# Patient Record
Sex: MALE | Race: WHITE | Employment: OTHER | URBAN - METROPOLITAN AREA
[De-identification: names, ages, dates, MRNs, and addresses within clinical notes are randomized per-mention and may not be internally consistent; named-entity substitution may affect disease eponyms.]

---

## 2017-02-14 ENCOUNTER — COMMUNICATION - HEALTHEAST (OUTPATIENT)
Dept: FAMILY MEDICINE | Facility: CLINIC | Age: 67
End: 2017-02-14

## 2017-02-14 DIAGNOSIS — E11.9 DIABETES (H): ICD-10-CM

## 2017-03-04 ENCOUNTER — COMMUNICATION - HEALTHEAST (OUTPATIENT)
Dept: FAMILY MEDICINE | Facility: CLINIC | Age: 67
End: 2017-03-04

## 2017-03-04 DIAGNOSIS — E78.5 HYPERLIPIDEMIA: ICD-10-CM

## 2017-04-06 ENCOUNTER — OFFICE VISIT - HEALTHEAST (OUTPATIENT)
Dept: FAMILY MEDICINE | Facility: CLINIC | Age: 67
End: 2017-04-06

## 2017-04-06 DIAGNOSIS — N40.0 BPH (BENIGN PROSTATIC HYPERPLASIA): ICD-10-CM

## 2017-04-06 DIAGNOSIS — E11.9 DIABETES MELLITUS, TYPE II (H): ICD-10-CM

## 2017-04-06 DIAGNOSIS — R25.2 LEG CRAMPS: ICD-10-CM

## 2017-04-06 LAB
HBA1C MFR BLD: 6.2 % (ref 3.5–6)
PSA SERPL-MCNC: 1.7 NG/ML (ref 0–4.5)

## 2017-04-06 ASSESSMENT — MIFFLIN-ST. JEOR: SCORE: 1752.36

## 2017-06-14 ENCOUNTER — RECORDS - HEALTHEAST (OUTPATIENT)
Dept: ADMINISTRATIVE | Facility: OTHER | Age: 67
End: 2017-06-14

## 2017-08-03 ENCOUNTER — RECORDS - HEALTHEAST (OUTPATIENT)
Dept: ADMINISTRATIVE | Facility: OTHER | Age: 67
End: 2017-08-03

## 2017-08-18 ENCOUNTER — THERAPY VISIT (OUTPATIENT)
Dept: PHYSICAL THERAPY | Facility: CLINIC | Age: 67
End: 2017-08-18
Payer: MEDICARE

## 2017-08-18 DIAGNOSIS — M25.551 RIGHT HIP PAIN: Primary | ICD-10-CM

## 2017-08-18 PROCEDURE — G8978 MOBILITY CURRENT STATUS: HCPCS | Mod: GP | Performed by: PHYSICAL THERAPIST

## 2017-08-18 PROCEDURE — 97112 NEUROMUSCULAR REEDUCATION: CPT | Mod: GP | Performed by: PHYSICAL THERAPIST

## 2017-08-18 PROCEDURE — 97161 PT EVAL LOW COMPLEX 20 MIN: CPT | Mod: GP | Performed by: PHYSICAL THERAPIST

## 2017-08-18 PROCEDURE — G8979 MOBILITY GOAL STATUS: HCPCS | Mod: GP | Performed by: PHYSICAL THERAPIST

## 2017-08-18 ASSESSMENT — ACTIVITIES OF DAILY LIVING (ADL)
SITTING_FOR_15_MINUTES: NO DIFFICULTY AT ALL
GETTING_INTO_AND_OUT_OF_AN_AVERAGE_CAR: MODERATE DIFFICULTY
WALKING_15_MINUTES_OR_GREATER: EXTREME DIFFICULTY
STANDING_FOR_15_MINUTES: MODERATE DIFFICULTY
LIGHT_TO_MODERATE_WORK: MODERATE DIFFICULTY
ROLLING_OVER_IN_BED: SLIGHT DIFFICULTY
HOS_ADL_ITEM_SCORE_TOTAL: 17
HOS_ADL_COUNT: 10
PUTTING_ON_SOCKS_AND_SHOES: UNABLE TO DO
WALKING_APPROXIMATELY_10_MINUTES: EXTREME DIFFICULTY
HEAVY_WORK: EXTREME DIFFICULTY
WALKING_INITIALLY: MODERATE DIFFICULTY
TWISTING/PIVOTING_ON_INVOLVED_LEG: UNABLE TO DO
HOS_ADL_HIGHEST_POTENTIAL_SCORE: 40
STEPPING_UP_AND_DOWN_CURBS: SLIGHT DIFFICULTY
HOW_WOULD_YOU_RATE_YOUR_CURRENT_LEVEL_OF_FUNCTION_DURING_YOUR_USUAL_ACTIVITIES_OF_DAILY_LIVING_FROM_0_TO_100_WITH_100_BEING_YOUR_LEVEL_OF_FUNCTION_PRIOR_TO_YOUR_HIP_PROBLEM_AND_0_BEING_THE_INABILITY_TO_PERFORM_ANY_OF_YOUR_USUAL_DAILY_ACTIVITIES?: 30

## 2017-08-18 NOTE — LETTER
"DEPARTMENT OF HEALTH AND HUMAN SERVICES  CENTERS FOR MEDICARE & MEDICAID SERVICES    PLAN/UPDATED PLAN OF PROGRESS FOR OUTPATIENT REHABILITATION    PATIENTS NAME:  John Mack   : 1950  PROVIDER NUMBER:    [id number]  [id number]  PROVIDER NAME: [facility] PT  MEDICAL RECORD NUMBER: [id number]   START OF CARE DATE:  SOC Date: 17   TYPE:  PT  PRIMARY/TREATMENT DIAGNOSIS: (Pertinent Medical Diagnosis)  Right hip pain    VISITS FROM START OF CARE:  Rxs Used: 1     [facility] Initial Evaluation    Subjective:  Patient is a 66 year old male presenting with rehab right hip hpi. The history is provided by the patient. No  was used. John Mack is a 66 year old male with a right hip condition.  Condition occurred with:  Insidious onset.  Condition occurred: for unknown reasons.  This is a new condition  Patient reports history of spontaneous right hip fracture 3 years ago. Underwent ORIF but it \"didn't work\" so he ended up having right JACKELIN 2 years ago. On 17, patient describes sliding into a farr at a restaurant when his right hip dislocated. He was transported by ambulance to the ED where his hip was eventually reduced. Just four days prior to this incident, he also recalls his hip \"popping out and then back in again\" when getting off the couch at home. Patient reports pain:  Posterior.  Radiates to:  Thigh.  Pain is described as aching and is intermittent and reported as 2/10.  Associated symptoms:  Loss of strength, loss of motion/stiffness and other (instability). Pain is the same all the time.  Symptoms are exacerbated by transfers and walking and relieved by rest.  Since onset symptoms are unchanged.  Special tests:  X-ray.  Previous treatment includes physical therapy (following original hip surgery).  There was significant improvement following previous treatment.  General health as reported by patient is fair.  " "Pertinent medical history includes:  Sleep disorder/apnea, overweight, diabetes, history of fractures and other (osteopenia; foot drop from JACKELIN -- currently wears AFO).  Medical allergies: no.  Surgical history: right hip ORIF, right JACKELIN, left ankle ORIF.  Medication history: Metformin, Simvastatin.  Current occupation is Retired.      Barriers include:  Requires assistance with ADL's (dressing LE's).  Red flags:  None as reported by the patient.    Objective:  Standing Alignment:    Lumbar:  Lordosis decr  Pelvic:  Iliac crest high R  Hip:  Greater trochanter high R  Gait:    Weight Bearing Status:  WBAT   Assistive Devices:  Cane  Deviations:  General Deviations:  Stride length decr and stance time decr  Flexibility/Screens:   Lower Extremity:  Decreased right lower extremity flexibility:  Quadriceps  PATIENTS NAME:  John Mack   : 1950     Hip Evaluation  HIP AROM:    Flexion: Left: 110    Right:  80 (\"tight\")   Abduction: Left:  WNL     Right:  10  Internal Rotation: Left:   Right: Decreased  External Rotation: Left:   Right: Decreased  Knee Flexion:  Left: 130    Right: 110  Knee Extension: Left: WNL    Right: WNL    Hip Strength:    Flexion:   Left: 5/5   -  Pain:  Right: 4/5   -  Pain:     Extension:  Left: 3+/5  Pain:Right: 3-/5    -  Pain:    Abduction:  Left: 5/5    -   Pain:Right: 4+/5   -   Pain:  Internal Rotation:  Left: 4+/5   -   Pain:Right: 3+/5   -  Pain:  External Rotation:  Left: 4+/5   -  Pain:  Right: 3/5   -  Pain:  Knee Flexion:  Left: 5/5   -  Pain:Right: 5/5   -  Pain:  Knee Extension:  Left: 5/5   -  Pain:Right: 5/5    -  Pain:    Hip Special Testing:   Not Assessed  Hip Palpation:    Right hip tenderness present at:  Piriformis; Abductors; Gluteus Medius and Bursa  Right hip tenderness not present at:  Greater Trachanter  General    Delayed glute firing in right hip  Assessment/Plan:    Patient is a 66 year old male with right hip complaints.    Patient has the following " significant findings with corresponding treatment plan.                Diagnosis 1:  Right Hip Dislocation   Pain -  self management, education and home program  Decreased ROM/flexibility - therapeutic exercise  Instability - therapeutic exercise  Decreased strength - therapeutic exercise and therapeutic activities  Impaired gait - gait training and assistive devices  Decreased function - therapeutic activities and home program  Therapy Evaluation Codes:   1) History comprised of:   Personal factors that impact the plan of care:      Past/current experiences.    Comorbidity factors that impact the plan of care are:      Diabetes, Implanted device and osteopenia.     Medications impacting care: Metformin.  2) Examination of Body Systems comprised of:   Body structures and functions that impact the plan of care:      Hip.   Activity limitations that impact the plan of care are:      Bending, Driving, Dressing, Walking and transfers.  3) Clinical presentation characteristics are:   Stable/Uncomplicated.  4) Decision-Making    Low complexity using standardized patient assessment instrument and/or measureable assessment of functional outcome.  PATIENTS NAME:  John Mack   : 1950  Cumulative Therapy Evaluation is: Low complexity.  Previous and current functional limitations:  (See Goal Flow Sheet for this information)    Short term and Long term goals: (See Goal Flow Sheet for this information)   Communication ability:  Patient appears to be able to clearly communicate and understand verbal and written communication and follow directions correctly.  Treatment Explanation - The following has been discussed with the patient:    RX ordered/plan of care  Anticipated outcomes  Possible risks and side effects  This patient would benefit from PT intervention to resume normal activities.   Rehab potential is good.  Frequency:  2 X week, once daily  Duration:  for 2 weeks tapering to 1 X a week over 4  "weeks  Discharge Plan:  Achieve all LTG.  Independent in home treatment program.  Reach maximal therapeutic benefit.  Please refer to the daily flowsheet for treatment today, total treatment time and time spent performing 1:1 timed codes.             Caregiver Signature/Credentials _____________________________ Date ________      Treating Provider: Tish Arce, PT, OCS   I have reviewed and certified the need for these services and plan of treatment while under my care.        PHYSICIAN'S SIGNATURE:   _________________________________________  Date___________   Shen Madden    Certification period:  Beginning of Cert date period: 08/18/17 to  End of Cert period date: 11/15/17     Functional Level Progress Report: Please see attached \"Goal Flow sheet for Functional level.\"    ____X____ Continue Services or       ________ DC Services                Service dates: From  SOC Date: 08/18/17 date to present                         "

## 2017-08-18 NOTE — PROGRESS NOTES
"[facility] Initial Evaluation    Subjective:    Patient is a 66 year old male presenting with rehab right hip hpi. The history is provided by the patient. No  was used.   John Mack is a 66 year old male with a right hip condition.  Condition occurred with:  Insidious onset.  Condition occurred: for unknown reasons.  This is a new condition  Patient reports history of spontaneous right hip fracture 3 years ago. Underwent ORIF but it \"didn't work\" so he ended up having right JACKELIN 2 years ago. On 8/03/17, patient describes sliding into a farr at a restaurant when his right hip dislocated. He was transported by ambulance to the ED where his hip was eventually reduced. Just four days prior to this incident, he also recalls his hip \"popping out and then back in again\" when getting off the couch at home    .    Patient reports pain:  Posterior.  Radiates to:  Thigh.  Pain is described as aching and is intermittent and reported as 2/10.  Associated symptoms:  Loss of strength, loss of motion/stiffness and other (instability). Pain is the same all the time.  Symptoms are exacerbated by transfers and walking and relieved by rest.  Since onset symptoms are unchanged.  Special tests:  X-ray.  Previous treatment includes physical therapy (following original hip surgery).  There was significant improvement following previous treatment.  General health as reported by patient is fair.  Pertinent medical history includes:  Sleep disorder/apnea, overweight, diabetes, history of fractures and other (osteopenia; foot drop from JACKELIN -- currently wears AFO).  Medical allergies: no.  Surgical history: right hip ORIF, right JACKELIN, left ankle ORIF.  Medication history: Metformin, Simvastatin.  Current occupation is Retired.        Barriers include:  Requires assistance with ADL's (dressing LE's).    Red flags:  None as reported by the patient.                        Objective:    Standing " "Alignment:        Lumbar:  Lordosis decr  Pelvic:  Iliac crest high R  Hip:  Greater trochanter high R        Gait:    Weight Bearing Status:  WBAT   Assistive Devices:  Cane  Deviations:  General Deviations:  Stride length decr and stance time decr    Flexibility/Screens:       Lower Extremity:      Decreased right lower extremity flexibility:  Quadriceps                                                 Hip Evaluation  HIP AROM:    Flexion: Left: 110    Right:  80 (\"tight\")       Abduction: Left:  WNL     Right:  10      Internal Rotation: Left:   Right: Decreased  External Rotation: Left:   Right: Decreased  Knee Flexion:  Left: 130    Right: 110  Knee Extension: Left: WNL    Right: WNL    Hip Strength:    Flexion:   Left: 5/5   -  Pain:  Right: 4/5   -  Pain:                    Extension:  Left: 3+/5  Pain:Right: 3-/5    -  Pain:    Abduction:  Left: 5/5    -   Pain:Right: 4+/5   -   Pain:    Internal Rotation:  Left: 4+/5   -   Pain:Right: 3+/5   -  Pain:  External Rotation:  Left: 4+/5   -  Pain:  Right: 3/5   -  Pain:  Knee Flexion:  Left: 5/5   -  Pain:Right: 5/5   -  Pain:  Knee Extension:  Left: 5/5   -  Pain:Right: 5/5    -  Pain:        Hip Special Testing:   Not Assessed        Hip Palpation:      Right hip tenderness present at:  Piriformis; Abductors; Gluteus Medius and Bursa  Right hip tenderness not present at:  Greater Trachanter             General    Delayed glute firing in right hip    ROS    Assessment/Plan:      Patient is a 66 year old male with right hip complaints.    Patient has the following significant findings with corresponding treatment plan.                Diagnosis 1:  Right Hip Dislocation   Pain -  self management, education and home program  Decreased ROM/flexibility - therapeutic exercise  Instability - therapeutic exercise  Decreased strength - therapeutic exercise and therapeutic activities  Impaired gait - gait training and assistive devices  Decreased function - therapeutic " activities and home program    Therapy Evaluation Codes:   1) History comprised of:   Personal factors that impact the plan of care:      Past/current experiences.    Comorbidity factors that impact the plan of care are:      Diabetes, Implanted device and osteopenia.     Medications impacting care: Metformin.  2) Examination of Body Systems comprised of:   Body structures and functions that impact the plan of care:      Hip.   Activity limitations that impact the plan of care are:      Bending, Driving, Dressing, Walking and transfers.  3) Clinical presentation characteristics are:   Stable/Uncomplicated.  4) Decision-Making    Low complexity using standardized patient assessment instrument and/or measureable assessment of functional outcome.  Cumulative Therapy Evaluation is: Low complexity.    Previous and current functional limitations:  (See Goal Flow Sheet for this information)    Short term and Long term goals: (See Goal Flow Sheet for this information)     Communication ability:  Patient appears to be able to clearly communicate and understand verbal and written communication and follow directions correctly.  Treatment Explanation - The following has been discussed with the patient:    RX ordered/plan of care  Anticipated outcomes  Possible risks and side effects  This patient would benefit from PT intervention to resume normal activities.   Rehab potential is good.    Frequency:  2 X week, once daily  Duration:  for 2 weeks tapering to 1 X a week over 4 weeks  Discharge Plan:  Achieve all LTG.  Independent in home treatment program.  Reach maximal therapeutic benefit.    Please refer to the daily flowsheet for treatment today, total treatment time and time spent performing 1:1 timed codes.

## 2017-08-18 NOTE — MR AVS SNAPSHOT
After Visit Summary   8/18/2017    John Mack    MRN: [id number]           Patient Information     Date Of Birth          1950        Visit Information        Provider Department      8/18/2017 1:40 PM Tish Arce, PT [facility] [place] [facility]        Today's Diagnoses     Right hip pain    -  1       Follow-ups after your visit        Your next 10 appointments already scheduled     Aug 21, 2017  9:25 AM CDT   DARYL Extremity with Jericho Monsivais PT   [facility] [place] [facility] ([facility] [place])    [address]  [place] [address]   [phone]            Aug 25, 2017  8:05 AM CDT   DARYL Extremity with Jericho Monsivais PT   [facility] [place] [facility] ([facility] [place])    [address]  [place] [address]   [phone]            Sep 01, 2017 10:30 AM CDT   DARYL Extremity with Tish Arce PT   [facility] [place] [facility] ([facility] [place])    [address]  [place] [address]   [phone]            Sep 08, 2017 10:45 AM CDT   DARYL Extremity with Jericho Monsivais PT   [facility] [place] [facility] ([facility] [place])    [address]  [place] [address]   [phone]              Who to contact     If you have questions or need follow up information about today's clinic visit or your schedule please contact [facility] directly at [phone].  Normal or non-critical lab and imaging results will be communicated to you by MyChart, letter or phone within 4 business days after the clinic has received the results. If you do not hear from us within 7 days, please contact the clinic through MyChart or phone. If you have a critical or abnormal lab result, we will notify you by phone as soon as possible.  Submit refill requests through [employer] or call your pharmacy and they will forward the  "refill request to us. Please allow 3 business days for your refill to be completed.          Additional Information About Your Visit        MyChart Information     [employer] lets you send messages to your doctor, view your test results, renew your prescriptions, schedule appointments and more. To sign up, go to [email or web address] . Click on \"Log in\" on the left side of the screen, which will take you to the Welcome page. Then click on \"Sign up Now\" on the right side of the page.     You will be asked to enter the access code listed below, as well as some personal information. Please follow the directions to create your username and password.     Your access code is: [email or web address]  Expires: 2017  4:48 PM     Your access code will  in 90 days. If you need help or a new code, please call your [facility] clinic or [phone].        Care EveryWhere ID     This is your Care EveryWhere ID. This could be used by other organizations to access your [facility] medical records  [id number]         Blood Pressure from Last 3 Encounters:   No data found for BP    Weight from Last 3 Encounters:   No data found for Wt              We Performed the Following     HC PT EVAL, LOW COMPLEXITY     DARYL CERT REPORT     NEUROMUSCULAR RE-EDUCATION        Primary Care Provider Office Phone # Fax #    Girma BONITA Woodruff [phone]       [facility] [address] [place] [address] [address] [place] [address]        Equal Access to Services     [facility]: Hadii aad ku hadasho Soomaali, waaxda luqadaha, qaybta kaalmada adeegyada, baldemar cheatham . So [facility] [phone].    ATENCIÓN: Si habla español, tiene a sánchez disposición servicios gratuitos de asistencia lingüística. Llame al [phone].    We comply with applicable federal civil rights laws and [place] laws. We do not discriminate on the basis of race, color, national origin, age, disability sex, sexual orientation or gender identity.          "   Thank you!     Thank you for choosing [facility]  for your care. Our goal is always to provide you with excellent care. Hearing back from our patients is one way we can continue to improve our services. Please take a few minutes to complete the written survey that you may receive in the mail after your visit with us. Thank you!             Your Updated Medication List - Protect others around you: Learn how to safely use, store and throw away your medicines at www.disposemymeds.org.      Notice  As of 8/18/2017  4:48 PM    You have not been prescribed any medications.

## 2017-08-21 ENCOUNTER — THERAPY VISIT (OUTPATIENT)
Dept: PHYSICAL THERAPY | Facility: CLINIC | Age: 67
End: 2017-08-21
Payer: MEDICARE

## 2017-08-21 DIAGNOSIS — M25.551 RIGHT HIP PAIN: ICD-10-CM

## 2017-08-21 PROCEDURE — 97110 THERAPEUTIC EXERCISES: CPT | Mod: GP | Performed by: PHYSICAL THERAPIST

## 2017-08-21 PROCEDURE — 97530 THERAPEUTIC ACTIVITIES: CPT | Mod: GP | Performed by: PHYSICAL THERAPIST

## 2017-08-21 PROCEDURE — 97112 NEUROMUSCULAR REEDUCATION: CPT | Mod: GP | Performed by: PHYSICAL THERAPIST

## 2017-08-25 ENCOUNTER — THERAPY VISIT (OUTPATIENT)
Dept: PHYSICAL THERAPY | Facility: CLINIC | Age: 67
End: 2017-08-25
Payer: MEDICARE

## 2017-08-25 DIAGNOSIS — M25.551 RIGHT HIP PAIN: ICD-10-CM

## 2017-08-25 PROCEDURE — 97110 THERAPEUTIC EXERCISES: CPT | Mod: GP | Performed by: PHYSICAL THERAPIST

## 2017-08-25 PROCEDURE — 97112 NEUROMUSCULAR REEDUCATION: CPT | Mod: GP | Performed by: PHYSICAL THERAPIST

## 2017-08-25 PROCEDURE — 97530 THERAPEUTIC ACTIVITIES: CPT | Mod: GP | Performed by: PHYSICAL THERAPIST

## 2017-08-25 NOTE — MR AVS SNAPSHOT
"              After Visit Summary   8/25/2017    John Mack    MRN: [id number]           Patient Information     Date Of Birth          1950        Visit Information        Provider Department      8/25/2017 8:05 AM Jericho Monsivais, PT [facility] [place] [facility]        Today's Diagnoses     Right hip pain           Follow-ups after your visit        Your next 10 appointments already scheduled     Sep 01, 2017 10:30 AM CDT   DARYL Extremity with Tish Arce PT   [facility] [place] [facility] ([facility] [place])    [address]  [place] [address]   [phone]            Sep 08, 2017 10:45 AM CDT   DARYL Extremity with Jericho Monsivais PT   [facility] [place] [facility] ([facility] [place])    [address]  [place] [address]   [phone]              Who to contact     If you have questions or need follow up information about today's clinic visit or your schedule please contact [facility] directly at [phone].  Normal or non-critical lab and imaging results will be communicated to you by [employer], letter or phone within 4 business days after the clinic has received the results. If you do not hear from us within 7 days, please contact the clinic through [employer] or phone. If you have a critical or abnormal lab result, we will notify you by phone as soon as possible.  Submit refill requests through [employer] or call your pharmacy and they will forward the refill request to us. Please allow 3 business days for your refill to be completed.          Additional Information About Your Visit        [employer] Information     [employer] lets you send messages to your doctor, view your test results, renew your prescriptions, schedule appointments and more. To sign up, go to [email or web address] . Click on \"Log in\" on the left side of the screen, which will take you to the Welcome page. Then click " "on \"Sign up Now\" on the right side of the page.     You will be asked to enter the access code listed below, as well as some personal information. Please follow the directions to create your username and password.     Your access code is: [email or web address]  Expires: 2017  4:48 PM     Your access code will  in 90 days. If you need help or a new code, please call your [facility] or [phone].        Care EveryWhere ID     This is your Care EveryWhere ID. This could be used by other organizations to access your [facility] medical records  [id number]         Blood Pressure from Last 3 Encounters:   No data found for BP    Weight from Last 3 Encounters:   No data found for Wt              We Performed the Following     Neuromuscular Re-Education     Therapeutic Activities     Therapeutic Exercises        Primary Care Provider Office Phone # Fax #    Girma Woodruff [phone]       [facility] [address] [place] [address] Dr [place] [address]        Equal Access to Services     [facility]: Hadii aad ku hadasho Soomaali, waaxda luqadaha, qaybta kaalmada adeegyada, waxay idiin hayaan alisaeg juan carlos cheatham . So [facility] [phone].    ATENCIÓN: Si habla español, tiene a sánchez disposición servicios gratuitos de asistencia lingüística. Llame al [phone].    We comply with applicable federal civil rights laws and [place] laws. We do not discriminate on the basis of race, color, national origin, age, disability sex, sexual orientation or gender identity.            Thank you!     Thank you for choosing [facility]  for your care. Our goal is always to provide you with excellent care. Hearing back from our patients is one way we can continue to improve our services. Please take a few minutes to complete the written survey that you may receive in the mail after your visit with us. Thank you!             Your Updated Medication List - Protect others around you: Learn " how to safely use, store and throw away your medicines at www.disposemymeds.org.      Notice  As of 8/25/2017 12:27 PM    You have not been prescribed any medications.

## 2017-08-25 NOTE — PROGRESS NOTES
Subjective:    HPI                    Objective:    System    Physical Exam    General     ROS    Assessment/Plan:      SUBJECTIVE  Subjective: Pt reports he continues to gain confidence in the leg and is nothing more than muscular sore.  Doesn't use the cane all of the time.   Current Pain level: 2/10   Changes in function:  Yes (See Goal flowsheet attached for changes in current functional level)     Adverse reaction to treatment or activity:  None    OBJECTIVE  Objective: Pt ambulates without device, short steps B.  Observed L hip abduction at late swing phase L.     ASSESSMENT  John continues to require intervention to meet STG and LTG's: PT  Patient is progressing as expected.  Response to therapy has shown an improvement in  gait and function  Progress made towards STG/LTG?  Yes (See Goal flowsheet attached for updates on achievement of STG and LTG)    PLAN  Current treatment program is being advanced to more complex exercises.  Continue to emphasize gluteal facilitation.    PTA/ATC plan:  N/A    Please refer to the daily flowsheet for treatment today, total treatment time and time spent performing 1:1 timed codes.

## 2017-09-01 ENCOUNTER — THERAPY VISIT (OUTPATIENT)
Dept: PHYSICAL THERAPY | Facility: CLINIC | Age: 67
End: 2017-09-01
Payer: MEDICARE

## 2017-09-01 DIAGNOSIS — M25.551 RIGHT HIP PAIN: ICD-10-CM

## 2017-09-01 PROCEDURE — 97110 THERAPEUTIC EXERCISES: CPT | Mod: GP | Performed by: PHYSICAL THERAPIST

## 2017-09-01 PROCEDURE — 97112 NEUROMUSCULAR REEDUCATION: CPT | Mod: GP | Performed by: PHYSICAL THERAPIST

## 2017-09-08 ENCOUNTER — THERAPY VISIT (OUTPATIENT)
Dept: PHYSICAL THERAPY | Facility: CLINIC | Age: 67
End: 2017-09-08
Payer: MEDICARE

## 2017-09-08 DIAGNOSIS — M25.551 RIGHT HIP PAIN: ICD-10-CM

## 2017-09-08 PROCEDURE — 97110 THERAPEUTIC EXERCISES: CPT | Mod: GP | Performed by: PHYSICAL THERAPIST

## 2017-09-08 PROCEDURE — 97530 THERAPEUTIC ACTIVITIES: CPT | Mod: GP | Performed by: PHYSICAL THERAPIST

## 2017-09-08 ASSESSMENT — ACTIVITIES OF DAILY LIVING (ADL)
HOS_ADL_ITEM_SCORE_TOTAL: 31
LIGHT_TO_MODERATE_WORK: MODERATE DIFFICULTY
WALKING_APPROXIMATELY_10_MINUTES: NO DIFFICULTY AT ALL
DEEP_SQUATTING: EXTREME DIFFICULTY
TWISTING/PIVOTING_ON_INVOLVED_LEG: MODERATE DIFFICULTY
WALKING_INITIALLY: NO DIFFICULTY AT ALL
STEPPING_UP_AND_DOWN_CURBS: SLIGHT DIFFICULTY
HEAVY_WORK: EXTREME DIFFICULTY
WALKING_15_MINUTES_OR_GREATER: NO DIFFICULTY AT ALL
HOS_ADL_HIGHEST_POTENTIAL_SCORE: 48
PUTTING_ON_SOCKS_AND_SHOES: UNABLE TO DO
GETTING_INTO_AND_OUT_OF_AN_AVERAGE_CAR: MODERATE DIFFICULTY
STANDING_FOR_15_MINUTES: SLIGHT DIFFICULTY
ROLLING_OVER_IN_BED: SLIGHT DIFFICULTY
HOS_ADL_COUNT: 12
SITTING_FOR_15_MINUTES: NO DIFFICULTY AT ALL
RECREATIONAL_ACTIVITIES: MODERATE DIFFICULTY

## 2017-09-08 NOTE — MR AVS SNAPSHOT
"              After Visit Summary   9/8/2017    John Mack    MRN: [id number]           Patient Information     Date Of Birth          1950        Visit Information        Provider Department      9/8/2017 10:45 AM Jericho Monsivais, PT [facility] [place] [facility]        Today's Diagnoses     Right hip pain           Follow-ups after your visit        Your next 10 appointments already scheduled     Sep 26, 2017  1:50 PM CDT   DARYL Extremity with Jericho Monsivais PT   [facility] [place] [facility] ([facility] [place])    [address]  [place] [address]   [phone]            Oct 10, 2017  1:50 PM CDT   DARYL Extremity with Jericho Monsivais PT   [facility] [place] [facility] ([facility] [place])    [address]  [place] [address]   [phone]              Who to contact     If you have questions or need follow up information about today's clinic visit or your schedule please contact [facility] [facility] directly at [phone].  Normal or non-critical lab and imaging results will be communicated to you by [employer], letter or phone within 4 business days after the clinic has received the results. If you do not hear from us within 7 days, please contact the clinic through [employer] or phone. If you have a critical or abnormal lab result, we will notify you by phone as soon as possible.  Submit refill requests through [employer] or call your pharmacy and they will forward the refill request to us. Please allow 3 business days for your refill to be completed.          Additional Information About Your Visit        [employer] Information     NuFlick lets you send messages to your doctor, view your test results, renew your prescriptions, schedule appointments and more. To sign up, go to [email or web address] . Click on \"Log in\" on the left side of the screen, which will take you to the Welcome page. Then click on " "\"Sign up Now\" on the right side of the page.     You will be asked to enter the access code listed below, as well as some personal information. Please follow the directions to create your username and password.     Your access code is: [email or web address]  Expires: 2017  4:48 PM     Your access code will  in 90 days. If you need help or a new code, please call your [facility] or [phone].        Care EveryWhere ID     This is your Care EveryWhere ID. This could be used by other organizations to access your [place] medical records  [id number]         Blood Pressure from Last 3 Encounters:   No data found for BP    Weight from Last 3 Encounters:   No data found for Wt              We Performed the Following     Therapeutic Activities     Therapeutic Exercises        Primary Care Provider Office Phone # Fax #    Girma MESA Ranjan [phone]       [facility] [address] [place] [address] Dr [place] [address]        Equal Access to Services     SANTHOSH HOYOS : Hadii aad ku hadasho Soomaali, waaxda luqadaha, qaybta kaalmada adeegyada, waxay idiin hayaan izabel khronald cheatham . So [facility] [phone].    ATENCIÓN: Si shaw serrato, tiene a sánchez disposición servicios gratuitos de asistencia lingüística. Llame al [phone].    We comply with applicable federal civil rights laws and [place] laws. We do not discriminate on the basis of race, color, national origin, age, disability sex, sexual orientation or gender identity.            Thank you!     Thank you for choosing [facility]  for your care. Our goal is always to provide you with excellent care. Hearing back from our patients is one way we can continue to improve our services. Please take a few minutes to complete the written survey that you may receive in the mail after your visit with us. Thank you!             Your Updated Medication List - Protect others around you: Learn how to safely use, store and throw " away your medicines at www.disposemymeds.org.      Notice  As of 9/8/2017 11:45 AM    You have not been prescribed any medications.

## 2017-09-08 NOTE — PROGRESS NOTES
"Subjective:    HPI                    Objective:    System    Physical Exam    General     ROS    Assessment/Plan:      SUBJECTIVE  Subjective: Pt reports he feels like he is gaining strength and control.  Has been walking more and getting \"tired but not sore.\"  Still fearful of recurrent dislocation.  Not yet fully dressing self as he relies on his wife to assist with socks, shoes, and sometimes pants.  \"I'd like to be not so dependent on my wife.\"   Current Pain level: 2/10   Changes in function:  Yes (See Goal flowsheet attached for changes in current functional level)     Adverse reaction to treatment or activity:  None    OBJECTIVE  Objective: Pt ambulates carrying cane without using it.  Pt able to reach dorsum of foot in sitting position with his own hands.     ASSESSMENT  John continues to require intervention to meet STG and LTG's: PT  Patient is progressing as expected.  Response to therapy has shown an improvement in  function  Progress made towards STG/LTG?  Yes (See Goal flowsheet attached for updates on achievement of STG and LTG)    PLAN  Good active progress to date and spent time today focusing on functional dressing tasks.  Two visits scheduled over the next two to three weeks.    PTA/ATC plan:  N/A    Please refer to the daily flowsheet for treatment today, total treatment time and time spent performing 1:1 timed codes.              "

## 2017-09-26 ENCOUNTER — THERAPY VISIT (OUTPATIENT)
Dept: PHYSICAL THERAPY | Facility: CLINIC | Age: 67
End: 2017-09-26
Payer: MEDICARE

## 2017-09-26 DIAGNOSIS — M25.551 RIGHT HIP PAIN: ICD-10-CM

## 2017-09-26 PROCEDURE — 97110 THERAPEUTIC EXERCISES: CPT | Mod: GP | Performed by: PHYSICAL THERAPIST

## 2017-09-26 NOTE — PROGRESS NOTES
Subjective:    HPI                    Objective:    System    Physical Exam    General     ROS    Assessment/Plan:      SUBJECTIVE  Subjective: Pt reports in general has been doing well.  Feels like has been a little more sore with weather changes and stiffest first thing in the morning.   Current Pain level: 2/10   Changes in function:  Yes (See Goal flowsheet attached for changes in current functional level)     Adverse reaction to treatment or activity:  None    OBJECTIVE  Objective: Pt ambulates without device.  Discomfort with resisted hip abduction and extension but not flexion or adduction.     ASSESSMENT  John continues to require intervention to meet STG and LTG's: PT  Patient is progressing as expected.  Response to therapy has shown an improvement in  function  Progress made towards STG/LTG?  Yes (See Goal flowsheet attached for updates on achievement of STG and LTG)    PLAN  Continue current treatment plan until patient demonstrates readiness to progress to higher level exercises.    PTA/ATC plan:  N/A    Please refer to the daily flowsheet for treatment today, total treatment time and time spent performing 1:1 timed codes.

## 2017-09-26 NOTE — MR AVS SNAPSHOT
"              After Visit Summary   9/26/2017    John Mack    MRN: [id number]           Patient Information     Date Of Birth          1950        Visit Information        Provider Department      9/26/2017 1:50 PM Jericho Monsivais PT [facility] [place] [facility]        Today's Diagnoses     Right hip pain           Follow-ups after your visit        Your next 10 appointments already scheduled     Oct 10, 2017  1:50 PM CDT   DARYL Extremity with Jericho Monsivais PT   [facility] [place] [facility] ([facility] [place])    [address]  [address]  [place] [address] [phone]              Who to contact     If you have questions or need follow up information about today's clinic visit or your schedule please contact [facility] directly at [phone].  Normal or non-critical lab and imaging results will be communicated to you by [employer], letter or phone within 4 business days after the clinic has received the results. If you do not hear from us within 7 days, please contact the clinic through [employer] or phone. If you have a critical or abnormal lab result, we will notify you by phone as soon as possible.  Submit refill requests through [employer] or call your pharmacy and they will forward the refill request to us. Please allow 3 business days for your refill to be completed.          Additional Information About Your Visit        MyChart Information     Bu[employer] lets you send messages to your doctor, view your test results, renew your prescriptions, schedule appointments and more. To sign up, go to [email or web address] . Click on \"Log in\" on the left side of the screen, which will take you to the Welcome page. Then click on \"Sign up Now\" on the right side of the page.     You will be asked to enter the access code listed below, as well as some personal information. Please follow the directions to create your username and password.   "   Your access code is: [email or web address]  Expires: 2017  4:48 PM     Your access code will  in 90 days. If you need help or a new code, please call your [place] clinic or [phone].        Care EveryWhere ID     This is your Care EveryWhere ID. This could be used by other organizations to access your [place] medical records  [id number]         Blood Pressure from Last 3 Encounters:   No data found for BP    Weight from Last 3 Encounters:   No data found for Wt              We Performed the Following     Therapeutic Exercises        Primary Care Provider Office Phone # Fax #    Girma Woodruff [phone]       [facility] [address] [place] [address]        Equal Access to Services     EARL HOYOS : Hadii aad ku hadasho Soomaali, waaxda luqadaha, qaybta kaalmada adeegyada, waxay abimaelin hayteresan izabel cheatham . So [facility] [place] [phone].    ATENCIÓN: Si habla español, tiene a sánchez disposición servicios gratuitos de asistencia lingüística. LlSt. [facility] [phone].    We comply with applicable federal civil rights laws and [place] laws. We do not discriminate on the basis of race, color, national origin, age, disability sex, sexual orientation or gender identity.            Thank you!     Thank you for choosing [facility]  for your care. Our goal is always to provide you with excellent care. Hearing back from our patients is one way we can continue to improve our services. Please take a few minutes to complete the written survey that you may receive in the mail after your visit with us. Thank you!             Your Updated Medication List - Protect others around you: Learn how to safely use, store and throw away your medicines at www.disposemymeds.org.      Notice  As of 2017  3:17 PM    You have not been prescribed any medications.

## 2017-10-09 ENCOUNTER — OFFICE VISIT - HEALTHEAST (OUTPATIENT)
Dept: FAMILY MEDICINE | Facility: CLINIC | Age: 67
End: 2017-10-09

## 2017-10-09 DIAGNOSIS — R94.4 DECREASED GFR: ICD-10-CM

## 2017-10-09 DIAGNOSIS — E78.5 HYPERLIPIDEMIA: ICD-10-CM

## 2017-10-09 DIAGNOSIS — S73.004A HIP DISLOCATION, RIGHT (H): ICD-10-CM

## 2017-10-09 DIAGNOSIS — E11.9 DIABETES MELLITUS, TYPE II (H): ICD-10-CM

## 2017-10-09 LAB
CHOLEST SERPL-MCNC: 122 MG/DL
FASTING STATUS PATIENT QL REPORTED: YES
HBA1C MFR BLD: 6.4 % (ref 3.5–6)
HDLC SERPL-MCNC: 40 MG/DL
LDLC SERPL CALC-MCNC: 50 MG/DL
TRIGL SERPL-MCNC: 159 MG/DL

## 2017-10-09 ASSESSMENT — MIFFLIN-ST. JEOR: SCORE: 1770.51

## 2017-10-10 ENCOUNTER — THERAPY VISIT (OUTPATIENT)
Dept: PHYSICAL THERAPY | Facility: CLINIC | Age: 67
End: 2017-10-10
Payer: MEDICARE

## 2017-10-10 DIAGNOSIS — M25.551 RIGHT HIP PAIN: ICD-10-CM

## 2017-10-10 PROCEDURE — 97110 THERAPEUTIC EXERCISES: CPT | Mod: GP | Performed by: PHYSICAL THERAPIST

## 2017-10-10 PROCEDURE — G8979 MOBILITY GOAL STATUS: HCPCS | Mod: GP | Performed by: PHYSICAL THERAPIST

## 2017-10-10 PROCEDURE — G8980 MOBILITY D/C STATUS: HCPCS | Mod: GP | Performed by: PHYSICAL THERAPIST

## 2017-10-10 NOTE — MR AVS SNAPSHOT
"              After Visit Summary   10/10/2017    John Mack    MRN: [id number]           Patient Information     Date Of Birth          1950        Visit Information        Provider Department      10/10/2017 1:50 PM Jericho Monsivais PT [facility] [place] [facility]        Today's Diagnoses     Right hip pain           Follow-ups after your visit        Who to contact     If you have questions or need follow up information about today's clinic visit or your schedule please contact [place] [facility] directly at [phone].  Normal or non-critical lab and imaging results will be communicated to you by awe.[email or web address], letter or phone within 4 business days after the clinic has received the results. If you do not hear from us within 7 days, please contact the clinic through [email or web address] or phone. If you have a critical or abnormal lab result, we will notify you by phone as soon as possible.  Submit refill requests through [employer] or call your pharmacy and they will forward the refill request to us. Please allow 3 business days for your refill to be completed.          Additional Information About Your Visit        MyChart Information     [employer] lets you send messages to your doctor, view your test results, renew your prescriptions, schedule appointments and more. To sign up, go to [email or web address] . Click on \"Log in\" on the left side of the screen, which will take you to the Welcome page. Then click on \"Sign up Now\" on the right side of the page.     You will be asked to enter the access code listed below, as well as some personal information. Please follow the directions to create your username and password.     Your access code is: [email or web address]  Expires: 2017  4:48 PM     Your access code will  in 90 days. If you need help or a new code, please call your [place] clinic or [phone].        Care EveryWhere ID     This is your Care EveryWhere ID. This could " be used by other organizations to access your [place] medical records  [id number]         Blood Pressure from Last 3 Encounters:   No data found for BP    Weight from Last 3 Encounters:   No data found for Wt              We Performed the Following     DARYL Progress Notes Report     Therapeutic Exercises        Primary Care Provider Office Phone # Fax #    Girma Woodruff [phone]       [facility] [address] [place] [address] Dr [place] [address]        Equal Access to Services     EARL HOYOS : Hadii aad ku hadasho Soomaali, waaxda luqadaha, qaybta kaalmada adeegyada, waxay idiin hayaan adeeg kharash la'aan . So [facility] [phone].    ATENCIÓN: Si habla español, tiene a sánchez disposición servicios gratuitos de asistencia lingüística. Llame al [phone].    We comply with applicable federal civil rights laws and [place] laws. We do not discriminate on the basis of race, color, national origin, age, disability, sex, sexual orientation, or gender identity.            Thank you!     Thank you for choosing [facility]  for your care. Our goal is always to provide you with excellent care. Hearing back from our patients is one way we can continue to improve our services. Please take a few minutes to complete the written survey that you may receive in the mail after your visit with us. Thank you!             Your Updated Medication List - Protect others around you: Learn how to safely use, store and throw away your medicines at www.disposemymeds.org.      Notice  As of 10/10/2017  2:24 PM    You have not been prescribed any medications.

## 2017-10-10 NOTE — LETTER
"[place] [facility]  [address]  [address]  [place] [address]  [phone]    October 10, 2017    Re: John Mack   :   1950  MRN:  [id number]   REFERRING PHYSICIAN:   Shen Madden    [place] [facility]    Date of Initial Evaluation: 17  Visits:  Rxs Used: 7  Reason for Referral:  Right hip pain     DISCHARGE REPORT    Progress reporting period is from 2017 to today.       SUBJECTIVE   Subjective: Pt states he still has difficulty with prolonged sitting on hard surface.  Has gotten easier using toilet without raised seat.  \"I'm getting closer\" to reaching foot for dressing.  Rates progress to date as 65%. Current Pain level:  (not rated numerically).   Initial Pain level: 2/10. Changes in function:  Yes (See Goal flowsheet attached for changes in current functional level) Adverse reaction to treatment or activity: None    OBJECTIVE  Objective: Pt ambulates without device.  No discomfort resisted hip motions all directions R.     ASSESSMENT/PLAN  Updated problem list and treatment plan: Diagnosis 1:  S/p hip dislocation -- home program  STG/LTGs have been met or progress has been made towards goals:  Yes (See Goal flow sheet completed today.)  Assessment of Progress: The patient's condition is improving.  Self Management Plans:  Patient is independent in a home treatment program.  I have re-evaluated this patient and find that the nature, scope, duration and intensity of the therapy is appropriate for the medical condition of the patient.  John continues to require the following intervention to meet STG and LTG's:  PT intervention is no longer required to meet STG/LTG.    Recommendations:  Pt has made good progress since beginning PT.  He indicates he feels comfortable continuing independently and feels like he knows what he needs to do.  Discharge to [facility] but pt to let me know if there are further " issues.        John Mack   :   1950                  Thank you for your referral.    INQUIRIES  Therapist: Jericho Monsivais PT, ATC, [facility]  [facility]  [address]  [place] [address]  Phone: [phone]  Fax: [phone]

## 2017-10-10 NOTE — PROGRESS NOTES
"Subjective:    HPI                    Objective:    System    Physical Exam    General     ROS    Assessment/Plan:      DISCHARGE REPORT    Progress reporting period is from 08/18/2017 to today.       SUBJECTIVE  Subjective: Pt states he still has difficulty with prolonged sitting on hard surface.  Has gotten easier using toilet without raised seat.  \"I'm getting closer\" to reaching foot for dressing.  Rates progress to date as 65%.    Current Pain level:  (not rated numerically).     Initial Pain level: 2/10.   Changes in function:  Yes (See Goal flowsheet attached for changes in current functional level)  Adverse reaction to treatment or activity: None    OBJECTIVE  Objective: Pt ambulates without device.  No discomfort resisted hip motions all directions R.     ASSESSMENT/PLAN  Updated problem list and treatment plan: Diagnosis 1:  S/p hip dislocation -- home program  STG/LTGs have been met or progress has been made towards goals:  Yes (See Goal flow sheet completed today.)  Assessment of Progress: The patient's condition is improving.  Self Management Plans:  Patient is independent in a home treatment program.  I have re-evaluated this patient and find that the nature, scope, duration and intensity of the therapy is appropriate for the medical condition of the patient.  John continues to require the following intervention to meet STG and LTG's:  PT intervention is no longer required to meet STG/LTG.    Recommendations:  Pt has made good progress since beginning PT.  He indicates he feels comfortable continuing independently and feels like he knows what he needs to do.  Discharge to [facility] but pt to let me know if there are further issues.    Please refer to the daily flowsheet for treatment today, total treatment time and time spent performing 1:1 timed codes.                "

## 2017-10-16 ENCOUNTER — COMMUNICATION - HEALTHEAST (OUTPATIENT)
Dept: FAMILY MEDICINE | Facility: CLINIC | Age: 67
End: 2017-10-16

## 2017-10-16 DIAGNOSIS — E78.5 HYPERLIPIDEMIA: ICD-10-CM

## 2017-12-07 ENCOUNTER — RECORDS - HEALTHEAST (OUTPATIENT)
Dept: ADMINISTRATIVE | Facility: OTHER | Age: 67
End: 2017-12-07

## 2018-01-03 ENCOUNTER — COMMUNICATION - HEALTHEAST (OUTPATIENT)
Dept: FAMILY MEDICINE | Facility: CLINIC | Age: 68
End: 2018-01-03

## 2018-01-03 DIAGNOSIS — E11.9 DIABETES (H): ICD-10-CM

## 2018-04-12 ENCOUNTER — OFFICE VISIT - HEALTHEAST (OUTPATIENT)
Dept: FAMILY MEDICINE | Facility: CLINIC | Age: 68
End: 2018-04-12

## 2018-04-12 DIAGNOSIS — Z76.89 SLEEP CONCERN: ICD-10-CM

## 2018-04-12 DIAGNOSIS — N40.0 BPH (BENIGN PROSTATIC HYPERPLASIA): ICD-10-CM

## 2018-04-12 DIAGNOSIS — E11.319 DIABETIC RETINOPATHY (H): ICD-10-CM

## 2018-04-12 DIAGNOSIS — E11.9 DIABETES MELLITUS, TYPE 2 (H): ICD-10-CM

## 2018-04-12 DIAGNOSIS — E78.00 HYPERCHOLESTEROLEMIA: ICD-10-CM

## 2018-04-12 LAB
ANION GAP SERPL CALCULATED.3IONS-SCNC: 10 MMOL/L (ref 5–18)
BUN SERPL-MCNC: 24 MG/DL (ref 8–22)
CALCIUM SERPL-MCNC: 9.9 MG/DL (ref 8.5–10.5)
CHLORIDE BLD-SCNC: 106 MMOL/L (ref 98–107)
CO2 SERPL-SCNC: 27 MMOL/L (ref 22–31)
CREAT SERPL-MCNC: 1.18 MG/DL (ref 0.7–1.3)
CREAT UR-MCNC: 198.6 MG/DL
GFR SERPL CREATININE-BSD FRML MDRD: >60 ML/MIN/1.73M2
GLUCOSE BLD-MCNC: 105 MG/DL (ref 70–125)
HBA1C MFR BLD: 6.6 % (ref 3.5–6)
MICROALBUMIN UR-MCNC: 0.92 MG/DL (ref 0–1.99)
MICROALBUMIN/CREAT UR: 4.6 MG/G
POTASSIUM BLD-SCNC: 4.6 MMOL/L (ref 3.5–5)
PSA SERPL-MCNC: 1.5 NG/ML (ref 0–4.5)
SODIUM SERPL-SCNC: 143 MMOL/L (ref 136–145)

## 2018-04-12 RX ORDER — GLUCOSAMINE HCL/CHONDROITIN SU 500-400 MG
CAPSULE ORAL
Qty: 100 STRIP | Refills: 3 | Status: SHIPPED | COMMUNITY
Start: 2018-04-12

## 2018-04-12 ASSESSMENT — MIFFLIN-ST. JEOR: SCORE: 1788.65

## 2018-05-14 ENCOUNTER — COMMUNICATION - HEALTHEAST (OUTPATIENT)
Dept: FAMILY MEDICINE | Facility: CLINIC | Age: 68
End: 2018-05-14

## 2018-05-14 DIAGNOSIS — R44.2 OLFACTORY HALLUCINATION: ICD-10-CM

## 2018-05-30 ENCOUNTER — OFFICE VISIT - HEALTHEAST (OUTPATIENT)
Dept: OTOLARYNGOLOGY | Facility: CLINIC | Age: 68
End: 2018-05-30

## 2018-05-30 DIAGNOSIS — R43.1 PAROSMIA: ICD-10-CM

## 2018-05-30 RX ORDER — MAGNESIUM 30 MG
30 TABLET ORAL 2 TIMES DAILY
Status: SHIPPED | COMMUNITY
Start: 2018-05-30

## 2018-05-30 RX ORDER — LANOLIN ALCOHOL/MO/W.PET/CERES
3 CREAM (GRAM) TOPICAL
Status: SHIPPED | COMMUNITY
Start: 2018-05-30

## 2018-06-05 ENCOUNTER — COMMUNICATION - HEALTHEAST (OUTPATIENT)
Dept: OTOLARYNGOLOGY | Facility: CLINIC | Age: 68
End: 2018-06-05

## 2018-06-05 DIAGNOSIS — R44.2 OLFACTORY HALLUCINATIONS: ICD-10-CM

## 2018-06-05 DIAGNOSIS — R43.1 PAROSMIA: ICD-10-CM

## 2018-06-07 ENCOUNTER — RECORDS - HEALTHEAST (OUTPATIENT)
Dept: ADMINISTRATIVE | Facility: OTHER | Age: 68
End: 2018-06-07

## 2018-06-18 ENCOUNTER — HOSPITAL ENCOUNTER (OUTPATIENT)
Dept: NEUROLOGY | Facility: HOSPITAL | Age: 68
Discharge: HOME | End: 2018-06-18
Attending: PSYCHIATRY & NEUROLOGY

## 2018-06-18 ENCOUNTER — HOSPITAL ENCOUNTER (OUTPATIENT)
Dept: MRI IMAGING | Facility: HOSPITAL | Age: 68
Discharge: HOME | End: 2018-06-18
Attending: PSYCHIATRY & NEUROLOGY

## 2018-06-18 DIAGNOSIS — R44.2 OLFACTORY HALLUCINATION: ICD-10-CM

## 2018-06-18 LAB
CREAT BLD-MCNC: 1.2 MG/DL
CREAT BLD-MCNC: 1.2 MG/DL (ref 0.7–1.3)
POC GFR AMER AF HE - HISTORICAL: >60 ML/MIN/1.73M2
POC GFR NON AMER AF HE - HISTORICAL: >60 ML/MIN/1.73M2

## 2018-06-20 ENCOUNTER — RECORDS - HEALTHEAST (OUTPATIENT)
Dept: ADMINISTRATIVE | Facility: OTHER | Age: 68
End: 2018-06-20

## 2018-07-18 ENCOUNTER — OFFICE VISIT - HEALTHEAST (OUTPATIENT)
Dept: OTOLARYNGOLOGY | Facility: CLINIC | Age: 68
End: 2018-07-18

## 2018-07-18 DIAGNOSIS — K11.8 PAROTID MASS: ICD-10-CM

## 2018-07-18 DIAGNOSIS — K11.9 DISEASE OF SALIVARY GLAND, UNSPECIFIED: ICD-10-CM

## 2018-07-26 ENCOUNTER — COMMUNICATION - HEALTHEAST (OUTPATIENT)
Dept: SURGERY | Facility: CLINIC | Age: 68
End: 2018-07-26

## 2018-07-31 ENCOUNTER — HOSPITAL ENCOUNTER (OUTPATIENT)
Dept: ULTRASOUND IMAGING | Facility: HOSPITAL | Age: 68
Discharge: HOME | End: 2018-07-31
Attending: OTOLARYNGOLOGY

## 2018-08-03 LAB
LAB AP CHARGES (HE HISTORICAL CONVERSION): NORMAL
LAB MED GENERAL PATH INTERP (HE HISTORICAL CONVERSION): NORMAL
PATH REPORT.COMMENTS IMP SPEC: NORMAL
PATH REPORT.COMMENTS IMP SPEC: NORMAL
PATH REPORT.FINAL DX SPEC: NORMAL
PATH REPORT.MICROSCOPIC SPEC OTHER STN: NORMAL
PATH REPORT.RELEVANT HX SPEC: NORMAL
RESULT FLAG (HE HISTORICAL CONVERSION): NORMAL
SPECIMEN DESCRIPTION: NORMAL

## 2018-08-09 ENCOUNTER — COMMUNICATION - HEALTHEAST (OUTPATIENT)
Dept: OTOLARYNGOLOGY | Facility: CLINIC | Age: 68
End: 2018-08-09

## 2018-08-30 ENCOUNTER — RECORDS - HEALTHEAST (OUTPATIENT)
Dept: ADMINISTRATIVE | Facility: OTHER | Age: 68
End: 2018-08-30

## 2018-09-17 ENCOUNTER — RECORDS - HEALTHEAST (OUTPATIENT)
Dept: ADMINISTRATIVE | Facility: OTHER | Age: 68
End: 2018-09-17

## 2018-09-24 ENCOUNTER — RECORDS - HEALTHEAST (OUTPATIENT)
Dept: ADMINISTRATIVE | Facility: OTHER | Age: 68
End: 2018-09-24

## 2018-10-12 ENCOUNTER — OFFICE VISIT - HEALTHEAST (OUTPATIENT)
Dept: FAMILY MEDICINE | Facility: CLINIC | Age: 68
End: 2018-10-12

## 2018-10-12 ENCOUNTER — COMMUNICATION - HEALTHEAST (OUTPATIENT)
Dept: FAMILY MEDICINE | Facility: CLINIC | Age: 68
End: 2018-10-12

## 2018-10-12 DIAGNOSIS — G47.33 OSA ON CPAP: ICD-10-CM

## 2018-10-12 DIAGNOSIS — E11.319 DIABETIC RETINOPATHY (H): ICD-10-CM

## 2018-10-12 DIAGNOSIS — G47.00 INSOMNIA: ICD-10-CM

## 2018-10-12 DIAGNOSIS — E78.5 HYPERLIPIDEMIA: ICD-10-CM

## 2018-10-12 DIAGNOSIS — E11.9 TYPE 2 DIABETES MELLITUS (H): ICD-10-CM

## 2018-10-12 LAB
ALBUMIN SERPL-MCNC: 4.2 G/DL (ref 3.5–5)
ALP SERPL-CCNC: 53 U/L (ref 45–120)
ALT SERPL W P-5'-P-CCNC: 39 U/L (ref 0–45)
ANION GAP SERPL CALCULATED.3IONS-SCNC: 11 MMOL/L (ref 5–18)
AST SERPL W P-5'-P-CCNC: 29 U/L (ref 0–40)
BILIRUB DIRECT SERPL-MCNC: 0.2 MG/DL
BILIRUB SERPL-MCNC: 0.8 MG/DL (ref 0–1)
BUN SERPL-MCNC: 25 MG/DL (ref 8–22)
CALCIUM SERPL-MCNC: 9.8 MG/DL (ref 8.5–10.5)
CHLORIDE BLD-SCNC: 106 MMOL/L (ref 98–107)
CHOLEST SERPL-MCNC: 121 MG/DL
CO2 SERPL-SCNC: 25 MMOL/L (ref 22–31)
CREAT SERPL-MCNC: 1.14 MG/DL (ref 0.7–1.3)
FASTING STATUS PATIENT QL REPORTED: YES
GFR SERPL CREATININE-BSD FRML MDRD: >60 ML/MIN/1.73M2
GLUCOSE BLD-MCNC: 101 MG/DL (ref 70–125)
HBA1C MFR BLD: 6.5 % (ref 3.5–6)
HDLC SERPL-MCNC: 39 MG/DL
LDLC SERPL CALC-MCNC: 45 MG/DL
POTASSIUM BLD-SCNC: 4.7 MMOL/L (ref 3.5–5)
PROT SERPL-MCNC: 7.3 G/DL (ref 6–8)
SODIUM SERPL-SCNC: 142 MMOL/L (ref 136–145)
TRIGL SERPL-MCNC: 185 MG/DL

## 2018-10-12 ASSESSMENT — MIFFLIN-ST. JEOR: SCORE: 1804.24

## 2018-10-13 RX ORDER — SIMVASTATIN 20 MG
TABLET ORAL
Qty: 90 TABLET | Refills: 3 | Status: SHIPPED | OUTPATIENT
Start: 2018-10-13

## 2018-10-22 ENCOUNTER — RECORDS - HEALTHEAST (OUTPATIENT)
Dept: ADMINISTRATIVE | Facility: OTHER | Age: 68
End: 2018-10-22

## 2018-11-14 ENCOUNTER — RECORDS - HEALTHEAST (OUTPATIENT)
Dept: ADMINISTRATIVE | Facility: OTHER | Age: 68
End: 2018-11-14

## 2018-12-12 ENCOUNTER — COMMUNICATION - HEALTHEAST (OUTPATIENT)
Dept: FAMILY MEDICINE | Facility: CLINIC | Age: 68
End: 2018-12-12

## 2018-12-12 DIAGNOSIS — E11.9 DIABETES (H): ICD-10-CM

## 2020-08-27 ENCOUNTER — THERAPY VISIT (OUTPATIENT)
Dept: PHYSICAL THERAPY | Facility: CLINIC | Age: 70
End: 2020-08-27
Payer: COMMERCIAL

## 2020-08-27 DIAGNOSIS — R26.9 ABNORMAL GAIT: ICD-10-CM

## 2020-08-27 DIAGNOSIS — R26.89 BALANCE PROBLEMS: ICD-10-CM

## 2020-08-27 PROCEDURE — 97162 PT EVAL MOD COMPLEX 30 MIN: CPT | Mod: GP | Performed by: PHYSICAL THERAPIST

## 2020-08-27 PROCEDURE — 97110 THERAPEUTIC EXERCISES: CPT | Mod: GP | Performed by: PHYSICAL THERAPIST

## 2020-08-27 NOTE — PROGRESS NOTES
"[place] [facility] Initial Evaluation  Subjective:  The history is provided by the patient. No  was used.   Therapist Generated HPI Evaluation  Problem details: Pt notes he was doing quite well after last being seen in PT in 2017.  Pt reports that he noticed some general weakness in April 2020 without specific incident.  Seemed to have some stumbling and fell a couple times.  Ended up in neurology and had variety of diagnostic work including brain MRI, EMG.  Referred to PT 08/10/2020.         Type of problem:  Bilateral hips.    This is a new condition.  Condition occurred with:  Insidious onset.    Site of Pain: R buttock, B thighs.        Exacerbated by: walking, sit to stand, prolonged sitting.  Relieved by: sitting on pillow.          Patient Health History  John Mack being seen for weakness in legs.       Problem occurred: not sure   Pain is reported as 3/10 on pain scale.  General health as reported by patient is fair.  Pertinent medical history includes: diabetes.   Red flags:  Foot drop.  Medical allergies: none.   Surgeries include:  Orthopedic surgery. Other surgery history details: R JACKELIN and subsequent dislocation.     Other medications details: Metformin.    Current occupation is retired.                   Pt states his goal is to have \"something I can do to strengthen the legs.\"                    Objective:    Gait:  Pt ambulates with single point cane in L hand, AFO on R.                                                     Hip Evaluation  Hip PROM:    Flexion: Left: 90 deg negative   Right: 90 deg \"tight\"        Internal Rotation: Left: 0 deg    Right: DNT  External Rotation: Left: 40 deg    Right: 20 deg negative              Hip Strength:    Flexion:   Left: 4/5   -  Pain:  Right: 4/5   -  Pain:                    Extension:  Left: 4/5  -  Pain:Right: 4/5    -  Pain:    Abduction:  Left: 4/5    -   Pain:Right: 4/5   -   Pain:        Knee Flexion:  Left: 4/5  "  -  Pain:Right: 4/5   -  Pain:  Knee Extension:  Left: 4/5   -  Pain:Right: 4/5    -  Pain:                         Sanna Lumbar Evaluation    Posture:  Sitting: fair  Standing: fair  Lordosis: WNL  Lateral Shift: no      Movement Loss:  Flexion (Flex): min  Extension (EXT): min  Side Glide R (SG R): min  Side [place] L (SG L): min                                               ROS    Assessment/Plan:    Patient is a 69 year old male with bilateral lower extremity complaints.    Patient has the following significant findings with corresponding treatment plan.                Diagnosis 1:  B lower extremity weakness -- plan to look further at balance next session  Pain -  hot/cold therapy and splint/taping/bracing/orthotics  Decreased ROM/flexibility - manual therapy and therapeutic exercise  Decreased strength - therapeutic exercise and therapeutic activities  Impaired balance - neuro re-education and therapeutic activities  Impaired gait - gait training and assistive devices  Impaired muscle performance - neuro re-education  Decreased function - therapeutic activities    Therapy Evaluation Codes:   1) History comprised of:   Personal factors that impact the plan of care:      None and Time since onset of symptoms.    Comorbidity factors that impact the plan of care are:      Diabetes and h/o hip dislocation.     Medications impacting care: diabetic medication.  2) Examination of Body Systems comprised of:   Body structures and functions that impact the plan of care:      Hip and Knee.   Activity limitations that impact the plan of care are:      Standing and Walking.  3) Clinical presentation characteristics are:   Unstable/Unpredictable.  4) Decision-Making    Moderate complexity using standardized patient assessment instrument and/or measureable assessment of functional outcome.  Cumulative Therapy Evaluation is: Moderate complexity.    Previous and current functional limitations:  (See Goal Flow Sheet for this  information)    Short term and Long term goals: (See Goal Flow Sheet for this information)     Communication ability:  Patient appears to be able to clearly communicate and understand verbal and written communication and follow directions correctly.  Treatment Explanation - The following has been discussed with the patient:   RX ordered/plan of care  Anticipated outcomes  Possible risks and side effects  This patient would benefit from PT intervention to resume normal activities.   Rehab potential is fair.    Frequency:  1 X week, once daily  Duration:  for 6 weeks  Discharge Plan:  Achieve all LTG.  Independent in home treatment program.  Reach maximal therapeutic benefit.    Please refer to the daily flowsheet for treatment today, total treatment time and time spent performing 1:1 timed codes.

## 2020-09-04 ENCOUNTER — THERAPY VISIT (OUTPATIENT)
Dept: PHYSICAL THERAPY | Facility: CLINIC | Age: 70
End: 2020-09-04
Payer: COMMERCIAL

## 2020-09-04 DIAGNOSIS — R26.9 ABNORMAL GAIT: ICD-10-CM

## 2020-09-04 DIAGNOSIS — R26.89 BALANCE PROBLEMS: ICD-10-CM

## 2020-09-04 PROCEDURE — 97112 NEUROMUSCULAR REEDUCATION: CPT | Mod: GP | Performed by: PHYSICAL THERAPIST

## 2020-09-04 PROCEDURE — 97530 THERAPEUTIC ACTIVITIES: CPT | Mod: GP | Performed by: PHYSICAL THERAPIST

## 2020-09-04 PROCEDURE — 97110 THERAPEUTIC EXERCISES: CPT | Mod: GP | Performed by: PHYSICAL THERAPIST

## 2020-09-11 ENCOUNTER — THERAPY VISIT (OUTPATIENT)
Dept: PHYSICAL THERAPY | Facility: CLINIC | Age: 70
End: 2020-09-11
Payer: COMMERCIAL

## 2020-09-11 DIAGNOSIS — R26.89 BALANCE PROBLEMS: ICD-10-CM

## 2020-09-11 DIAGNOSIS — R26.9 ABNORMAL GAIT: ICD-10-CM

## 2020-09-11 PROCEDURE — 97110 THERAPEUTIC EXERCISES: CPT | Mod: GP | Performed by: PHYSICAL THERAPIST

## 2020-09-11 PROCEDURE — 97112 NEUROMUSCULAR REEDUCATION: CPT | Mod: GP | Performed by: PHYSICAL THERAPIST

## 2020-09-11 PROCEDURE — 97530 THERAPEUTIC ACTIVITIES: CPT | Mod: GP | Performed by: PHYSICAL THERAPIST

## 2020-09-11 NOTE — PROGRESS NOTES
Subjective:  HPI  Physical Exam                    Objective:  System    Physical Exam    General     ROS    Assessment/Plan:    SUBJECTIVE  Subjective: Pt notes he is getting stronger.  Feels walking is getting easier and can do so without looking down as much.  However, still doesn't feel always steady on feet.  Not using cane as much.   Current Pain level: (not rated numerically)   Changes in function:  Yes (See Goal flowsheet attached for changes in current functional level)     Adverse reaction to treatment or activity:  None    OBJECTIVE  Objective: Pt ambulates without device, lacks arm swing L compared to R.     ASSESSMENT  John continues to require intervention to meet STG and LTG's: PT  Patient is progressing as expected.  Response to therapy has shown an improvement in  function  Progress made towards STG/LTG?  Yes (See Goal flowsheet attached for updates on achievement of STG and LTG)    PLAN  Current treatment program is being advanced to more complex exercises.    PTA/ATC plan:  N/A    Please refer to the daily flowsheet for treatment today, total treatment time and time spent performing 1:1 timed codes.

## 2020-09-21 ENCOUNTER — THERAPY VISIT (OUTPATIENT)
Dept: PHYSICAL THERAPY | Facility: CLINIC | Age: 70
End: 2020-09-21
Payer: COMMERCIAL

## 2020-09-21 DIAGNOSIS — R26.89 BALANCE PROBLEMS: ICD-10-CM

## 2020-09-21 DIAGNOSIS — R26.9 ABNORMAL GAIT: ICD-10-CM

## 2020-09-21 PROCEDURE — 97110 THERAPEUTIC EXERCISES: CPT | Mod: GP | Performed by: PHYSICAL THERAPIST

## 2020-09-21 PROCEDURE — 97530 THERAPEUTIC ACTIVITIES: CPT | Mod: GP | Performed by: PHYSICAL THERAPIST

## 2020-09-22 NOTE — PROGRESS NOTES
"Subjective:  HPI  Physical Exam                    Objective:  System    Physical Exam    General     ROS    Assessment/Plan:    SUBJECTIVE  Subjective: Pt states, \"I've been working on walking with the L arm.\"  He feels he has to be conscious of his mechanics in order to get the arm to swing.  Continues to feel his strength is gaining.  The \"elephant in the room\" is still trying to understand why he is feeling this way but feels good that we are on the right track.   Current Pain level: (not rated numerically)   Changes in function:  Yes (See Goal flowsheet attached for changes in current functional level)     Adverse reaction to treatment or activity:  None    OBJECTIVE  Objective: See gait observations in flowsheet and ability to equalize mechanics with conscious attention.     ASSESSMENT  John continues to require intervention to meet STG and LTG's: PT  Patient is progressing as expected.  Response to therapy has shown an improvement in  function  Progress made towards STG/LTG?  Yes (See Goal flowsheet attached for updates on achievement of STG and LTG)    PLAN  One visit remains scheduled.  Reassess and determine further course of care.    PTA/ATC plan:  N/A    Please refer to the daily flowsheet for treatment today, total treatment time and time spent performing 1:1 timed codes.        "

## 2020-09-28 ENCOUNTER — THERAPY VISIT (OUTPATIENT)
Dept: PHYSICAL THERAPY | Facility: CLINIC | Age: 70
End: 2020-09-28
Payer: COMMERCIAL

## 2020-09-28 DIAGNOSIS — R26.89 BALANCE PROBLEMS: ICD-10-CM

## 2020-09-28 DIAGNOSIS — R26.9 ABNORMAL GAIT: ICD-10-CM

## 2020-09-28 PROCEDURE — 97530 THERAPEUTIC ACTIVITIES: CPT | Mod: GP | Performed by: PHYSICAL THERAPIST

## 2020-09-28 PROCEDURE — 97110 THERAPEUTIC EXERCISES: CPT | Mod: GP | Performed by: PHYSICAL THERAPIST

## 2020-09-28 NOTE — LETTER
"[facility]  [address]  [place] [address]  [phone]    2020    Re: John Mack   :   1950  MRN:  [id number]   REFERRING PHYSICIAN:   Sarah Beth José    [facility] [place]    Date of Initial Evaluation:   Visits:  Rxs Used: 5  Reason for Referral:     Balance problems  Abnormal gait    DISCHARGE REPORT    Progress reporting period is from 2020 to 2020.       SUBJECTIVE  Subjective: \"I think it's going well.\"  Pt reports feeling more stability and more confiidence on feet.  \"That's what I was hoping would happen and it did.\"  Feels like he still needs to think about how he is walking.  Even the gluteal discomfort has gotten better.  Only takes cane when he feels he will be on uneven surfaces.    Current Pain level: 0/10.     Initial Pain level: 3/10.   Changes in function:  Yes (See Goal flowsheet attached for changes in current functional level)  Adverse reaction to treatment or activity: None    OBJECTIVE  Objective: Pt ambulates without device, reciprocal arm swing exaggerated but no gross instability.  No discomfort resisted hip and knee motions.  SLS eyes open without hand support < 2 seconds B.  SLS with two fingertips support > 60 seconds B.     ASSESSMENT/PLAN  Updated problem list and treatment plan: Diagnosis 1:  Lower extremity weakness -- home program  STG/LTGs have been met or progress has been made towards goals:  Yes (See Goal flow sheet completed today.)  Assessment of Progress: The patient's condition is improving.  Self Management Plans:  Patient is independent in a home treatment program.  I have re-evaluated this patient and find that the nature, scope, duration and intensity of the therapy is appropriate for the medical condition of the patient.  John continues to require the following intervention to meet STG and LTG's:  PT intervention is no longer required to meet STG/LTG.      John Mack   :   " 1950                    Recommendations:  Given progress, pt agrees discharge to [facility] but will let me know if there are further issues.                Thank you for your referral.    INQUIRIES  Therapist: Jericho Monsivais, PT, ATC, CSCS  [facility] [place]  [address]  [place] [address]  Phone: [phone]  Fax: [phone]

## 2020-09-28 NOTE — PROGRESS NOTES
"Subjective:  HPI  Physical Exam                    Objective:  System    Physical Exam    General     ROS    Assessment/Plan:    DISCHARGE REPORT    Progress reporting period is from 08/27/2020 to 09/28/2020.       SUBJECTIVE  Subjective: \"I think it's going well.\"  Pt reports feeling more stability and more confiidence on feet.  \"That's what I was hoping would happen and it did.\"  Feels like he still needs to think about how he is walking.  Even the gluteal discomfort has gotten better.  Only takes cane when he feels he will be on uneven surfaces.    Current Pain level: 0/10.     Initial Pain level: 3/10.   Changes in function:  Yes (See Goal flowsheet attached for changes in current functional level)  Adverse reaction to treatment or activity: None    OBJECTIVE  Objective: Pt ambulates without device, reciprocal arm swing exaggerated but no gross instability.  No discomfort resisted hip and knee motions.  SLS eyes open without hand support < 2 seconds B.  SLS with two fingertips support > 60 seconds B.     ASSESSMENT/PLAN  Updated problem list and treatment plan: Diagnosis 1:  Lower extremity weakness -- home program  STG/LTGs have been met or progress has been made towards goals:  Yes (See Goal flow sheet completed today.)  Assessment of Progress: The patient's condition is improving.  Self Management Plans:  Patient is independent in a home treatment program.  I have re-evaluated this patient and find that the nature, scope, duration and intensity of the therapy is appropriate for the medical condition of the patient.  John continues to require the following intervention to meet STG and LTG's:  PT intervention is no longer required to meet STG/LTG.    Recommendations:  Given progress, pt agrees discharge to [facility] but will let me know if there are further issues.    Please refer to the daily flowsheet for treatment today, total treatment time and time spent performing 1:1 timed codes.          "

## 2020-12-06 ENCOUNTER — HEALTH MAINTENANCE LETTER (OUTPATIENT)
Age: 70
End: 2020-12-06

## 2021-05-30 VITALS — HEIGHT: 71 IN | BODY MASS INDEX: 30.1 KG/M2 | WEIGHT: 215 LBS

## 2021-05-31 ENCOUNTER — RECORDS - HEALTHEAST (OUTPATIENT)
Dept: ADMINISTRATIVE | Facility: CLINIC | Age: 71
End: 2021-05-31

## 2021-05-31 VITALS — HEIGHT: 71 IN | BODY MASS INDEX: 30.66 KG/M2 | WEIGHT: 219 LBS

## 2021-06-01 VITALS — WEIGHT: 223 LBS | HEIGHT: 71 IN | BODY MASS INDEX: 31.22 KG/M2

## 2021-06-02 VITALS — HEIGHT: 71 IN | BODY MASS INDEX: 31.7 KG/M2 | WEIGHT: 226.44 LBS

## 2021-06-06 ENCOUNTER — HEALTH MAINTENANCE LETTER (OUTPATIENT)
Age: 71
End: 2021-06-06

## 2021-06-09 NOTE — PROGRESS NOTES
SUBJECTIVE:    This is a 66-year-old male with a known history of type 2 diabetes mellitus, hyperlipidemia, and benign prostatic hypertrophy who presents to the clinic for diabetes check.  He states his blood sugars have been stable.  His hemoglobin A1c is slightly up at 6.2%.  He continues to take metformin 500 mg daily.  Occasionally, he will have a lower blood sugar and will drink orange juice.  At his last visit we did discuss a corn on the left fifth toe.  This was shaved down at the time.  This does not cause significant discomfort.  He has been offered referral to podiatry.  He continues to experience right foot drop secondary to complications from previous injury and surgery.  He does wear a brace.  He has decreased sensation in his right foot.  He has noted recently that he has had some cramps in the right calf.  This seems to be present mostly at night.  Also, he has a history of benign prostatic hypertrophy.  He does express urinary frequency and decreased urinary stream.  He followed up with urology a year ago and was treated with Flomax at one point but did not tolerate that due to vision changes.  He would like to consider other options.  Also, he cannot take aspirin due to vision changes.  Review of systems is negative for headache, dizziness, chest pain, or palpitations.  Finally, he is due for a pneumonia shot.       Patient Active Problem List   Diagnosis     Esophageal Reflux     Benign Adenomatous Polyp Of The Large Intestine     Urinary Incontinence     Retinopathy     Tinnitus     Internal Hemorrhoids     Adult Sleep Apnea     Type 2 diabetes mellitus     Hyperlipidemia     Basal Cell Carcinoma Of The Skin     Hemorrhoids     Hiatal Hernia     Diverticulosis     Plantar Fasciitis     Femoral neck fracture     Hyperlipidemia     Diabetes     GERD (gastroesophageal reflux disease)     KARLA on CPAP     Acute pain     Acute urinary retention     Status post right hip replacement     Right foot drop      BPH (benign prostatic hyperplasia)       Current Outpatient Prescriptions on File Prior to Visit   Medication Sig     ANTIOX#10/OM3/DHA/EPA/LUT/ZEAX (I-CAPS ORAL) Take 1 capsule by mouth 2 (two) times a day.     calcium carbonate-vitamin D3 600 mg(1,500mg) -800 unit per tablet Take 2 tablets by mouth daily. With 800 vit D (he thinks)     CONTOUR TEST STRIPS Strp      metFORMIN (GLUCOPHAGE) 500 MG tablet TAKE 1 TABLET ONE TIME DAILY     MICROLET LANCET Misc      omeprazole (PRILOSEC) 20 MG capsule Take 20 mg by mouth every morning.      polyethylene glycol (MIRALAX) 17 gram packet Take 17 g by mouth daily.     simvastatin (ZOCOR) 20 MG tablet TAKE 1 TABLET EVERY DAY     [DISCONTINUED] aspirin 81 MG EC tablet Take 1 tablet (81 mg total) by mouth daily.     Current Facility-Administered Medications on File Prior to Visit   Medication     denosumab 60 mg (PROLIA 60 mg/ml)       No Known Allergies         A 12 point comprehensive review of systems was negative except as noted.      OBJECTIVE:     Vitals:    04/06/17 0833   BP: 114/68   Pulse: 60   Resp: 16   Temp: 98.3  F (36.8  C)       General: Alert, not acutely distressed   Head:  Atraumatic, normocephalic  Eyes:  PERRL, extraocular movements are intact  Nose:  Septum midline  Neck:  Supple without adenopathy or thyromegaly   Cardiovascular: S1-S2 with regular rate and without murmur, rub, or gallop   Lungs:  Clear to auscultation bilaterally   Extremities: Without cyanosis or edema   He has right foot drop and wears a brace  Monofilament testing reveals decreased sensation involving the right foot  He does have a corn overlying the left lateral fifth toe  Monofilament testing is normal of the left foot  He does have callus formation noted  Neuro:  CN II-XII appear intact        Laboratory Results:    Results for orders placed or performed in visit on 04/06/17   Glycosylated Hemoglobin A1c   Result Value Ref Range    Hemoglobin A1c 6.2 (H) 3.5 - 6.0 %          ASSESSMENT AND PLAN:    1. Diabetes mellitus, type II    Hemoglobin A1c is stable at 6.2%  Continue metformin 500 mg daily.  He will monitor for hypoglycemia  He is not able to take aspirin due to his vision changes  We will check a urinary microalbumin.  Discussed that he could be a candidate for an ACE inhibitor but he prefers to wait  Continue simvastatin  - Glycosylated Hemoglobin A1c  - Basic Metabolic Panel  Reviewed foot care  Offered referral to podiatry given that he has a corn but he prefers to wait    2. Leg cramps, right leg    Recommend exercise prior to bed  Recommend drinking plenty of fluids  We will check electrolytes as well as magnesium level  - Magnesium  If having ongoing issues consider a medicine for restless leg syndrome such as Mirapex    3. BPH (benign prostatic hyperplasia)    Check a PSA  - PSA (Prostatic-Specific Antigen), Diagnostic  He has been intolerant of Flomax  However, recommend follow-up with urology to discuss other options and to have his prostate rechecked      4.  Healthcare maintenance    Pneumonia vaccine was given    Patient agrees to plan        Dalton Easley MD      This note has been dictated and transcribed using voice recognition software.  Any grammatical or contextual distortions are unintentional and inherent to the software.

## 2021-06-13 NOTE — PROGRESS NOTES
Assessment/ Plan     1. Diabetes mellitus, type II    Hemoglobin A1c has increased to 6.4%  The slight increase may be secondary to decreased exercise since his right hip dislocation    Recommend he continue to work on his diet  Check a lipid cascade, hepatic profile, basic metabolic panel  - Glycosylated Hemoglobin A1c  - Basic Metabolic Panel  His urinary microalbumin was normal.  Favor an ACE inhibitor  Continue simvastatin  Reviewed foot care  Recommend annual eye examination    2. Hyperlipidemia    Continue simvastatin  Check a lipid cascade and hepatic profile  - Lipid Cascade  - Hepatic Profile    3. Decreased GFR    Discussed the importance of adequate blood pressure and cholesterol control  Check a basic Emma panel    4. Hip dislocation, right    ER visit required manipulation to reduce his dislocation  Recommend physical therapy to strengthen the right hip area    Healthcare maintenance    He received the influenza vaccine  Reviewed his PSA which is within normal limits but slightly higher.  This will be rechecked.  If there is a consistent trend then consider a referral to urology      Subjective:       John Mack is a 66 y.o. male who presents to the clinic for a diabetes check.  He has a history of type 2 diabetes mellitus and his last hemoglobin A1c increased to 6.2% from 6.1%.  He continues to take metformin on a consistent basis.  Since his last visit he did suffer an injury to his right hip.  He has had a previous total hip arthroplasty and suffered a dislocation of his hip.  As a result, he has not been able to get regular physical activity.  His blood sugars are slightly higher and his hemoglobin A1c has increased to 6.4%.  He does not have frequent hypoglycemic episodes.  His urine microalbumin test was normal.  His GFR did drop down to 58.  His PSA was slightly up at 1.7.  His total cholesterol was 106 and LDL 45 at the last visit.  As noted, he suffered a superolateral dislocation  "which was mainly reduced in the emergency department.  He has some pain and stiffness in that area.  Denies any concerns with his feet.  Denies vision concerns.  He has been compliant with his other medications.  He does want a flu shot today.  Review of systems negative for headache, dizziness, chest pain, palpitations.  He had mentioned leg cramps of the last visit his magnesium level was normal.  His electrolytes were also normal.    The following portions of the patient's history were reviewed and updated as appropriate: allergies, current medications, past family history, past medical history, past social history, past surgical history and problem list.    Review of Systems   A 12 point comprehensive review of systems was negative except as noted.      Current Outpatient Prescriptions   Medication Sig Dispense Refill     ANTIOX#10/OM3/DHA/EPA/LUT/ZEAX (I-CAPS ORAL) Take 1 capsule by mouth 2 (two) times a day.       blood glucose test (CONTOUR TEST STRIPS) strips Check blood sugars once per day 100 strip 3     calcium carbonate-vitamin D3 600 mg(1,500mg) -800 unit per tablet Take 2 tablets by mouth daily. With 800 vit D (he thinks)       generic lancets (MICROLET LANCET) Use to check blood sugars once per day 100 each 3     metFORMIN (GLUCOPHAGE) 500 MG tablet TAKE 1 TABLET ONE TIME DAILY 90 tablet 3     polyethylene glycol (MIRALAX) 17 gram packet Take 17 g by mouth daily.       simvastatin (ZOCOR) 20 MG tablet TAKE 1 TABLET EVERY DAY 90 tablet 2     Current Facility-Administered Medications   Medication Dose Route Frequency Provider Last Rate Last Dose     denosumab 60 mg (PROLIA 60 mg/ml)  60 mg Subcutaneous Q6 Months Maine Vyas MD   60 mg at 08/04/15 1136       Objective:      /68  Pulse (!) 56  Temp 98.3  F (36.8  C) (Oral)   Resp 16  Ht 5' 11\" (1.803 m)  Wt 219 lb (99.3 kg)  BMI 30.54 kg/m2      General appearance: alert, appears stated age and cooperative  Head: Normocephalic, " without obvious abnormality, atraumatic  Eyes: conjunctivae/corneas clear. PERRL, EOM's intact.   He wears glasses  Ears: normal TM's and external ear canals both ears  Nose: Nares normal. Septum midline. Mucosa normal. No drainage or sinus tenderness.  Throat: lips, mucosa, and tongue normal; teeth and gums normal  Neck: no adenopathy, no carotid bruit, no JVD, supple, symmetrical,  Lungs: clear to auscultation bilaterally  Heart: regular rate and rhythm, S1, S2 normal, no murmur, click, rub or gallop  Abdomen: soft, non-tender; bowel sounds normal; no masses,  no organomegaly  Extremities: extremities normal, atraumatic, no cyanosis or edema  Neurologic: Alert and oriented X 3. Normal coordination and gait         Recent Results (from the past 168 hour(s))   Glycosylated Hemoglobin A1c   Result Value Ref Range    Hemoglobin A1c 6.4 (H) 3.5 - 6.0 %          This note has been dictated using voice recognition software. Any grammatical or context distortions are unintentional and inherent to the software

## 2021-06-17 NOTE — PROGRESS NOTES
Assessment/ Plan     1. Diabetes mellitus, type 2    His hemoglobin A1c is 6.6% today  Recommend improving diet and exercise  He will continue metformin 500 mg daily  Recheck in 3-6 months  If his glucose numbers seem to be increasing we may need to increase metformin to 500 mg twice a day  - Glycosylated Hemoglobin A1c  - Basic Metabolic Panel  - Microalbumin, Random Urine    Foot examination does reveal decreased sensation with monofilament testing  Recommend an annual eye examination.  He does have a history of diabetic retinopathy which is being monitored  Check a urinary microalbumin.  Recommend an ACE inhibitor if that is abnormal    2. Hypercholesterolemia    Continue simvastatin  Checked a hepatic profile the last level  His LDL cholesterol was 50    3. BPH (benign prostatic hyperplasia)    Check a PSA  - PSA (Prostatic-Specific Antigen), Diagnostic  If the trend increases then consider referral to urology    4. Sleep concern    Reviewed sleep hygiene  Consider fluid restriction in the evening hours  Consider melatonin 3 mg at night  Consider use of trazodone    Patient agrees to kory      Subjective:       John Mack is a 67 y.o. male who presents to the clinic for a diabetes check.  He has a known history of type 2 diabetes mellitus, hyperlipidemia, diabetic retinopathy, and right foot drop.  He has had previous right total hip arthroplasty and has experienced foot drop.  He typically will wear a brace while ambulating.  He did suffer a fall at the end of last year and had a dislocation which required a trip to the emergency department.  As a result, he typically is less mobile as he wants to be careful.  His weight has increased, perhaps related to exercise changes.  His hemoglobin A1c has been increasing slowly over time from 6.2% to 6.4%, and more recently 6.6%.  He continues take metformin 500 mg once a day.    He has had mildly low GFR values in the past with a 58 at one check and 60 more  "recently.  His last total cholesterol is 122 with triglycerides of 159 and LDL was 50.    He reports he generally has been doing well.  He denies recent headache, dizziness, chest pain, or palpitations.  He denies any bowel or urinary changes.  He would like to get his PSA checked as that has slowly been increasing.  In the remote past he did follow-up with urology and had a postvoid residual check due to an enlarged prostate.  He has not required a medication recently for urinary obstruction.    The following portions of the patient's history were reviewed and updated as appropriate: allergies, current medications, past family history, past medical history, past social history, past surgical history and problem list.    Review of Systems   A 12 point comprehensive review of systems was negative except as noted.      Current Outpatient Prescriptions   Medication Sig Dispense Refill     ANTIOX#10/OM3/DHA/EPA/LUT/ZEAX (I-CAPS ORAL) Take 1 capsule by mouth 2 (two) times a day.       blood glucose test (CONTOUR TEST STRIPS) strips Check blood sugars once per day 100 strip 3     calcium carbonate-vitamin D3 600 mg(1,500mg) -800 unit per tablet Take 2 tablets by mouth daily. With 800 vit D (he thinks)       generic lancets (MICROLET LANCET) Use to check blood sugars once per day 100 each 3     metFORMIN (GLUCOPHAGE) 500 MG tablet TAKE 1 TABLET EVERY DAY 90 tablet 3     polyethylene glycol (MIRALAX) 17 gram packet Take 17 g by mouth daily.       simvastatin (ZOCOR) 20 MG tablet TAKE 1 TABLET EVERY DAY 90 tablet 2     Current Facility-Administered Medications   Medication Dose Route Frequency Provider Last Rate Last Dose     denosumab 60 mg (PROLIA 60 mg/ml)  60 mg Subcutaneous Q6 Months Maine Vyas MD   60 mg at 08/04/15 1136       Objective:      /68  Pulse 64  Temp 98  F (36.7  C) (Oral)   Resp 16  Ht 5' 11\" (1.803 m)  Wt (!) 223 lb (101.2 kg)  BMI 31.1 kg/m2      General appearance: alert, appears " stated age and cooperative  Head: Normocephalic, without obvious abnormality, atraumatic  Eyes: conjunctivae/corneas clear. PERRL, EOM's intact.   Nose: Nares normal. Septum midline  Throat: lips, mucosa, and tongue normal; teeth and gums normal  Neck: no adenopathy, supple, symmetrical  Lungs: clear to auscultation bilaterally  Heart: regular rate and rhythm, S1, S2 normal, no murmur, click, rub or gallop  Extremities: extremities normal, atraumatic, no cyanosis or edema  Monofilament testing reveals decreased sensation involving the plantar aspect of his feet just proximal to his toes  Skin: Skin color, texture, turgor normal. No rashes or lesions  Lymph nodes: Cervical nodes normal.  Neurologic: Alert and oriented X 3. Normal coordination and gait         Recent Results (from the past 168 hour(s))   Glycosylated Hemoglobin A1c   Result Value Ref Range    Hemoglobin A1c 6.6 (H) 3.5 - 6.0 %          This note has been dictated using voice recognition software. Any grammatical or context distortions are unintentional and inherent to the software

## 2021-06-18 NOTE — PROGRESS NOTES
HPI: This patient is a 68yo M who presents for evaluation of phantom smells at the request of Dr. Woodruff. The patient reports no issues with sinus infections, nasal congestion, rhinorrhea, epistaxis, headaches, vision changes, or other. Over the past few years, he has had phantom smells of smoke. It was rare at first, but seems to be picking up in frequency and potency. No dental issues to report. Has not seen Neurology yet.    Past medical history, surgical history, social history, family history, medications, and allergies have been reviewed with the patient and are documented above.    Review of Systems: a 10-system review was performed. Pertinent positives are noted in the HPI and on a separate scanned document in the chart.    PHYSICAL EXAMINATION:  GEN: no acute distress, normocephalic  EYES: extraocular movements are intact, pupils are equal and round. Sclera clear.   EARS: auricles are normally formed. The external auditory canals are clear with minimal to no cerumen. Tympanic membranes are intact bilaterally with no signs of infection, effusion, retractions, or perforations.  NOSE: anterior nares are patent. There are no masses or lesions. The septum is non-obstructing.  NASAL ENDOSCOPY: nasal tissues healthy, nasal airway patent. No polyps, masses, or nasal roof abnormalities to note  OC/OP: clear, dentition is in good repair. The tongue and palate are fully mobile and symmetric. No masses or lesions.  NECK: soft and supple. No lymphadenopathy or masses. Airway is midline.  NEURO: CN VII symmetric and strong. alert and oriented. Nospontaneous nystagmus. Gait is normal.  PULM: breathing comfortably on room air, normal chest expansion with respiration    MEDICAL DECISION-MAKING: This patient is a 68yo M with parosmias. Will obtain a CT of the sinuses to rule out ENT pathology. Will call him with the results.

## 2021-06-19 NOTE — PROGRESS NOTES
HPI: This patient is a 68yo M I've seen previously for olfactory hallucinations now being cared for by Neurology who presents to clinic for evaluation of a parotid mass identified on an MRI. He has not ever noticed any mass, numbness, weakness, or other symptoms. No fevers, weight loss, odynophagia, dysphagia, salivary changes, or other.     Past medical history, surgical history, social history, family history, medications, and allergies have been reviewed with the patient and are documented above.    Review of Systems: a 10-system review was performed. Pertinent positives are noted in the HPI and on a separate scanned document in the chart.    PHYSICAL EXAMINATION:  GEN: no acute distress, normocephalic  EYES: extraocular movements are intact, pupils are equal and round. Sclera clear.   NOSE: anterior nares are patent.   NECK: soft and supple. No lymphadenopathy. No palpable parotid mass. Airway is midline.  NEURO: CN VII symmetric and strong. alert and oriented. No spontaneous nystagmus.  PULM: breathing comfortably on room air, normal chest expansion with respiration    MRI HEAD:  CONCLUSION:  1.  No mass, hemorrhage, or recent infarct identified including along the expected course of the olfactory grooves/bulbs and in the cribriform plate region.  2.  Mild presumed age-related changes as above.  3.  Indeterminate 11 mm superficial right parotid lesion. A benign mixed tumor or other primary parotid lesion is favored over pathologic intraparotid lymph node.    MEDICAL DECISION-MAKING: This patient is a 68yo M with a 1cm right parotid mass. Will send for an FNA of this before committing to a parotidectomy. He likes this approach. Will call him with the results of the FNA and a plan.

## 2021-06-21 NOTE — PROGRESS NOTES
Assessment/ Plan     1. Type 2 diabetes mellitus (H)    Hemoglobin A1c is 6.5%  Continue metformin  Monitor blood sugars and continue to work on diet and remain physically active  Follow-up with ophthalmology on a regular basis  Have reviewed foot care  - Basic Metabolic Panel  - Glycosylated Hemoglobin A1c  Continue simvastatin    2. Diabetic retinopathy (H)    Have reviewed the importance of adequate blood sugar control  Follow-up with ophthalmology    3. Hyperlipidemia    Continue simvastatin  - Lipid Cascade  - Hepatic Profile    4. KARLA on CPAP    Continue CPAP as well as a dental appliance    5. Insomnia    Reviewed sleep hygiene  Continue melatonin  Discussed trazodone as an option as well    Healthcare maintenance    Influenza vaccine was given  Reviewed the Shingrix/shingles vaccine    Patient verbalized understanding and agrees to the above plan      Subjective:       John Mack is a 67 y.o. male who presents  to the clinic in follow-up for multiple medical issues.  He has a known history of type 2 diabetes mellitus, hyperlipidemia, diabetic retinopathy, and foot drop.  He had a previous right total hip arthroplasty and has experienced right foot drop since then and does wear a brace for ambulation.    His diabetes has been well controlled.  He takes metformin 500 mg a day.  His hemoglobin A1c is 6.5%.  He generally has been doing well and follows up with ophthalmology on a consistent basis.  He has not had episodes of hypoglycemia.    He recently has had an evaluation for olfactory hallucinations and has followed up with neurology and with ENT.  He did have a right parotid mass evaluated by fine-needle aspiration with benign cytology noted..  An MRI was negative for other worrisome findings.  He has history of sleep apnea and uses CPAP as well as a mouthguard.  He does note he has had some challenges sleeping.  He has a difficult time falling asleep and does wake up regularly.  He has tried  "melatonin on occasion.  Also, he has followed up with urology in the past for benign prostatic hypertrophy and he has experienced urinary retention.  He has had a urethral stricture with planned cystoscopy.    He reports otherwise been feeling well.  Denies chest pain, shortness of breath, palpitations, or other concerns.        The following portions of the patient's history were reviewed and updated as appropriate: allergies, current medications, past family history, past medical history, past social history, past surgical history and problem list.    Review of Systems   A 12 point comprehensive review of systems was negative except as noted.      Current Outpatient Prescriptions   Medication Sig Dispense Refill     ANTIOX#10/OM3/DHA/EPA/LUT/ZEAX (I-CAPS ORAL) Take 1 capsule by mouth 2 (two) times a day.       blood glucose test (CONTOUR TEST STRIPS) strips Check blood sugars once per day 100 strip 3     calcium carbonate-vitamin D3 600 mg(1,500mg) -800 unit per tablet Take 2 tablets by mouth daily. With 800 vit D (he thinks)       generic lancets (MICROLET LANCET) Use to check blood sugars once per day 100 each 3     magnesium 30 mg tablet Take 30 mg by mouth 2 (two) times a day.       melatonin 3 mg Tab tablet Take 3 mg by mouth at bedtime as needed.       metFORMIN (GLUCOPHAGE) 500 MG tablet TAKE 1 TABLET EVERY DAY 90 tablet 3     polyethylene glycol (MIRALAX) 17 gram packet Take 17 g by mouth daily.       simvastatin (ZOCOR) 20 MG tablet TAKE 1 TABLET EVERY DAY 90 tablet 3     Current Facility-Administered Medications   Medication Dose Route Frequency Provider Last Rate Last Dose     denosumab 60 mg (PROLIA 60 mg/ml)  60 mg Subcutaneous Q6 Months Maine Vyas MD   60 mg at 08/04/15 1136       Objective:      /60  Pulse (!) 56  Temp 98  F (36.7  C) (Oral)   Resp 16  Ht 5' 11\" (1.803 m)  Wt (!) 226 lb 7 oz (102.7 kg)  BMI 31.58 kg/m2      General appearance: alert, appears stated age and " cooperative  Head: Normocephalic, without obvious abnormality, atraumatic  Eyes: conjunctivae/corneas clear. PERRL, EOM's intact.   Ears: normal TM's and external ear canals both ears  Nose: Nares normal. Septum midline. Mucosa normal. No drainage or sinus tenderness.  Throat: lips, mucosa, and tongue normal; teeth and gums normal  Neck: no adenopathy, supple, symmetrical, trachea midline   Lungs: clear to auscultation bilaterally  Heart: regular rate and rhythm, S1, S2 normal, no murmur, click, rub or gallop  Extremities: extremities normal, atraumatic, no cyanosis or edema  Skin: Skin color, texture, turgor normal. No rashes or lesions  Lymph nodes: Cervical nodes normal.  Neurologic: Alert and oriented X 3         No results found for this or any previous visit (from the past 168 hour(s)).       This note has been dictated using voice recognition software. Any grammatical or context distortions are unintentional and inherent to the software

## 2021-07-03 NOTE — ADDENDUM NOTE
Addendum Note by Marialuisa Gr MD at 4/6/2017  9:21 AM     Author: Marialuisa Gr MD Service: -- Author Type: Physician    Filed: 4/6/2017  9:21 AM Encounter Date: 4/6/2017 Status: Signed    : Marialuisa Gr MD (Physician)    Addended by: MARIALUISA GR on: 4/6/2017 09:21 AM        Modules accepted: Orders

## 2021-07-03 NOTE — ADDENDUM NOTE
Addendum Note by Marialuisa Gr MD at 5/15/2018  2:31 PM     Author: Marialuisa Gr MD Service: -- Author Type: Physician    Filed: 5/15/2018  2:31 PM Encounter Date: 5/14/2018 Status: Signed    : Marialuisa Gr MD (Physician)    Addended by: MARIALUISA GR on: 5/15/2018 02:31 PM        Modules accepted: Orders

## 2021-07-16 ENCOUNTER — APPOINTMENT (OUTPATIENT)
Dept: URBAN - METROPOLITAN AREA CLINIC 252 | Age: 71
Setting detail: DERMATOLOGY
End: 2021-07-16

## 2021-07-16 VITALS — RESPIRATION RATE: 16 BRPM | WEIGHT: 215 LBS | HEIGHT: 73 IN

## 2021-07-16 DIAGNOSIS — Z85.828 PERSONAL HISTORY OF OTHER MALIGNANT NEOPLASM OF SKIN: ICD-10-CM

## 2021-07-16 DIAGNOSIS — B07.8 OTHER VIRAL WARTS: ICD-10-CM

## 2021-07-16 DIAGNOSIS — Z71.89 OTHER SPECIFIED COUNSELING: ICD-10-CM

## 2021-07-16 DIAGNOSIS — L81.4 OTHER MELANIN HYPERPIGMENTATION: ICD-10-CM

## 2021-07-16 DIAGNOSIS — L82.1 OTHER SEBORRHEIC KERATOSIS: ICD-10-CM

## 2021-07-16 DIAGNOSIS — D18.0 HEMANGIOMA: ICD-10-CM

## 2021-07-16 PROBLEM — D18.01 HEMANGIOMA OF SKIN AND SUBCUTANEOUS TISSUE: Status: ACTIVE | Noted: 2021-07-16

## 2021-07-16 PROCEDURE — 99203 OFFICE O/P NEW LOW 30 MIN: CPT

## 2021-07-16 PROCEDURE — OTHER COUNSELING: OTHER

## 2021-07-16 ASSESSMENT — LOCATION SIMPLE DESCRIPTION DERM
LOCATION SIMPLE: LEFT UPPER BACK
LOCATION SIMPLE: LEFT FOREHEAD
LOCATION SIMPLE: RIGHT UPPER BACK
LOCATION SIMPLE: LEFT HAND
LOCATION SIMPLE: LEFT CHEEK
LOCATION SIMPLE: RIGHT CHEEK

## 2021-07-16 ASSESSMENT — LOCATION DETAILED DESCRIPTION DERM
LOCATION DETAILED: LEFT ULNAR DORSAL HAND
LOCATION DETAILED: LEFT SUPERIOR LATERAL BUCCAL CHEEK
LOCATION DETAILED: LEFT SUPERIOR UPPER BACK
LOCATION DETAILED: LEFT FOREHEAD
LOCATION DETAILED: RIGHT SUPERIOR LATERAL BUCCAL CHEEK
LOCATION DETAILED: LEFT INFERIOR UPPER BACK
LOCATION DETAILED: RIGHT SUPERIOR UPPER BACK

## 2021-07-16 ASSESSMENT — LOCATION ZONE DERM
LOCATION ZONE: TRUNK
LOCATION ZONE: FACE
LOCATION ZONE: HAND

## 2021-07-16 NOTE — PROCEDURE: COUNSELING
Detail Level: Zone
Patient Specific Counseling (Will Not Stick From Patient To Patient): -Froze as courtesy, instructed patient to return to clinic in one month if not resolved
Detail Level: Generalized
Detail Level: Detailed
Detail Level: Simple
Patient Specific Counseling (Will Not Stick From Patient To Patient): Patient declined treatment at this time.

## 2021-09-26 ENCOUNTER — HEALTH MAINTENANCE LETTER (OUTPATIENT)
Age: 71
End: 2021-09-26

## 2022-01-15 ENCOUNTER — HEALTH MAINTENANCE LETTER (OUTPATIENT)
Age: 72
End: 2022-01-15

## 2022-08-16 ENCOUNTER — APPOINTMENT (OUTPATIENT)
Dept: URBAN - METROPOLITAN AREA CLINIC 252 | Age: 72
Setting detail: DERMATOLOGY
End: 2022-08-16

## 2022-08-16 VITALS — HEIGHT: 71 IN | RESPIRATION RATE: 16 BRPM | WEIGHT: 215 LBS

## 2022-08-16 DIAGNOSIS — D18.0 HEMANGIOMA: ICD-10-CM

## 2022-08-16 PROBLEM — D29.4 BENIGN NEOPLASM OF SCROTUM: Status: ACTIVE | Noted: 2022-08-16

## 2022-08-16 PROBLEM — D18.01 HEMANGIOMA OF SKIN AND SUBCUTANEOUS TISSUE: Status: ACTIVE | Noted: 2022-08-16

## 2022-08-16 PROCEDURE — OTHER COUNSELING: OTHER

## 2022-08-16 PROCEDURE — 99212 OFFICE O/P EST SF 10 MIN: CPT

## 2022-08-16 ASSESSMENT — LOCATION ZONE DERM: LOCATION ZONE: LEG

## 2022-08-16 ASSESSMENT — LOCATION SIMPLE DESCRIPTION DERM: LOCATION SIMPLE: LEFT KNEE

## 2022-08-16 ASSESSMENT — LOCATION DETAILED DESCRIPTION DERM: LOCATION DETAILED: LEFT KNEE

## 2022-08-16 NOTE — HPI: SKIN LESION
Is This A New Presentation, Or A Follow-Up?: Skin Lesion
Additional History: Use rule out be bright red but now purple.

## 2022-08-16 NOTE — PROCEDURE: COUNSELING
Detail Level: Detailed
Detail Level: Simple
Patient Specific Counseling (Will Not Stick From Patient To Patient): Reassured pt that lesion is benign and to return for treatment if becomes symptomatic

## 2023-04-23 ENCOUNTER — HEALTH MAINTENANCE LETTER (OUTPATIENT)
Age: 73
End: 2023-04-23

## 2024-01-02 ENCOUNTER — TRANSFERRED RECORDS (OUTPATIENT)
Dept: HEALTH INFORMATION MANAGEMENT | Facility: CLINIC | Age: 74
End: 2024-01-02
Payer: COMMERCIAL